# Patient Record
Sex: MALE | Race: WHITE | Employment: OTHER | URBAN - METROPOLITAN AREA
[De-identification: names, ages, dates, MRNs, and addresses within clinical notes are randomized per-mention and may not be internally consistent; named-entity substitution may affect disease eponyms.]

---

## 2022-11-22 ENCOUNTER — APPOINTMENT (OUTPATIENT)
Dept: GENERAL RADIOLOGY | Age: 77
DRG: 871 | End: 2022-11-22
Payer: OTHER GOVERNMENT

## 2022-11-22 ENCOUNTER — HOSPITAL ENCOUNTER (INPATIENT)
Age: 77
LOS: 7 days | Discharge: HOME | DRG: 871 | End: 2022-11-29
Attending: EMERGENCY MEDICINE | Admitting: INTERNAL MEDICINE
Payer: OTHER GOVERNMENT

## 2022-11-22 DIAGNOSIS — D72.829 LEUKOCYTOSIS, UNSPECIFIED TYPE: ICD-10-CM

## 2022-11-22 DIAGNOSIS — R74.01 TRANSAMINITIS: ICD-10-CM

## 2022-11-22 DIAGNOSIS — E87.1 HYPONATREMIA: ICD-10-CM

## 2022-11-22 DIAGNOSIS — J18.9 COMMUNITY ACQUIRED PNEUMONIA, UNSPECIFIED LATERALITY: Primary | ICD-10-CM

## 2022-11-22 PROBLEM — J96.01 ACUTE RESPIRATORY FAILURE WITH HYPOXIA (HCC): Status: ACTIVE | Noted: 2022-11-22

## 2022-11-22 LAB
ALBUMIN SERPL-MCNC: 2.5 GM/DL (ref 3.4–5)
ALP BLD-CCNC: 203 IU/L (ref 40–129)
ALT SERPL-CCNC: 32 U/L (ref 10–40)
ANION GAP SERPL CALCULATED.3IONS-SCNC: 13 MMOL/L (ref 4–16)
AST SERPL-CCNC: 49 IU/L (ref 15–37)
BANDED NEUTROPHILS ABSOLUTE COUNT: 2.16 K/CU MM
BANDED NEUTROPHILS RELATIVE PERCENT: 9 % (ref 5–11)
BILIRUB SERPL-MCNC: 1.2 MG/DL (ref 0–1)
BUN BLDV-MCNC: 33 MG/DL (ref 6–23)
CALCIUM SERPL-MCNC: 8.3 MG/DL (ref 8.3–10.6)
CHLORIDE BLD-SCNC: 93 MMOL/L (ref 99–110)
CO2: 20 MMOL/L (ref 21–32)
CREAT SERPL-MCNC: 1.3 MG/DL (ref 0.9–1.3)
DIFFERENTIAL TYPE: ABNORMAL
GFR SERPL CREATININE-BSD FRML MDRD: 56 ML/MIN/1.73M2
GLUCOSE BLD-MCNC: 113 MG/DL (ref 70–99)
HCT VFR BLD CALC: 47.7 % (ref 42–52)
HEMOGLOBIN: 16.3 GM/DL (ref 13.5–18)
LACTATE: 2.4 MMOL/L (ref 0.4–2)
LYMPHOCYTES ABSOLUTE: 1.4 K/CU MM
LYMPHOCYTES RELATIVE PERCENT: 6 % (ref 24–44)
MCH RBC QN AUTO: 32.9 PG (ref 27–31)
MCHC RBC AUTO-ENTMCNC: 34.2 % (ref 32–36)
MCV RBC AUTO: 96.4 FL (ref 78–100)
MONOCYTES ABSOLUTE: 1 K/CU MM
MONOCYTES RELATIVE PERCENT: 4 % (ref 0–4)
MYELOCYTE PERCENT: 1 %
MYELOCYTES ABSOLUTE COUNT: 0.24 K/CU MM
PDW BLD-RTO: 16.1 % (ref 11.7–14.9)
PLATELET # BLD: 258 K/CU MM (ref 140–440)
PMV BLD AUTO: 10.7 FL (ref 7.5–11.1)
POTASSIUM SERPL-SCNC: 4.7 MMOL/L (ref 3.5–5.1)
PRO-BNP: 988.6 PG/ML
RAPID INFLUENZA  B AGN: NEGATIVE
RAPID INFLUENZA A AGN: NEGATIVE
RBC # BLD: 4.95 M/CU MM (ref 4.6–6.2)
RBC # BLD: ABNORMAL 10*6/UL
REASON FOR REJECTION: NORMAL
REJECTED TEST: NORMAL
SARS-COV-2, NAAT: NOT DETECTED
SEGMENTED NEUTROPHILS ABSOLUTE COUNT: 19.2 K/CU MM
SEGMENTED NEUTROPHILS RELATIVE PERCENT: 80 % (ref 36–66)
SODIUM BLD-SCNC: 126 MMOL/L (ref 135–145)
SOURCE: NORMAL
TOTAL PROTEIN: 6.7 GM/DL (ref 6.4–8.2)
TOXIC GRANULATION: PRESENT
TROPONIN T: <0.01 NG/ML
WBC # BLD: 24 K/CU MM (ref 4–10.5)

## 2022-11-22 PROCEDURE — 85027 COMPLETE CBC AUTOMATED: CPT

## 2022-11-22 PROCEDURE — 71045 X-RAY EXAM CHEST 1 VIEW: CPT

## 2022-11-22 PROCEDURE — 1200000000 HC SEMI PRIVATE

## 2022-11-22 PROCEDURE — 6370000000 HC RX 637 (ALT 250 FOR IP): Performed by: INTERNAL MEDICINE

## 2022-11-22 PROCEDURE — 84145 PROCALCITONIN (PCT): CPT

## 2022-11-22 PROCEDURE — 84484 ASSAY OF TROPONIN QUANT: CPT

## 2022-11-22 PROCEDURE — 2580000003 HC RX 258: Performed by: EMERGENCY MEDICINE

## 2022-11-22 PROCEDURE — 83605 ASSAY OF LACTIC ACID: CPT

## 2022-11-22 PROCEDURE — 83880 ASSAY OF NATRIURETIC PEPTIDE: CPT

## 2022-11-22 PROCEDURE — 87635 SARS-COV-2 COVID-19 AMP PRB: CPT

## 2022-11-22 PROCEDURE — 94640 AIRWAY INHALATION TREATMENT: CPT

## 2022-11-22 PROCEDURE — 93005 ELECTROCARDIOGRAM TRACING: CPT | Performed by: EMERGENCY MEDICINE

## 2022-11-22 PROCEDURE — 85007 BL SMEAR W/DIFF WBC COUNT: CPT

## 2022-11-22 PROCEDURE — 6370000000 HC RX 637 (ALT 250 FOR IP): Performed by: EMERGENCY MEDICINE

## 2022-11-22 PROCEDURE — 87804 INFLUENZA ASSAY W/OPTIC: CPT

## 2022-11-22 PROCEDURE — 99285 EMERGENCY DEPT VISIT HI MDM: CPT

## 2022-11-22 PROCEDURE — 80053 COMPREHEN METABOLIC PANEL: CPT

## 2022-11-22 PROCEDURE — 87040 BLOOD CULTURE FOR BACTERIA: CPT

## 2022-11-22 PROCEDURE — 96365 THER/PROPH/DIAG IV INF INIT: CPT

## 2022-11-22 PROCEDURE — 2060000000 HC ICU INTERMEDIATE R&B

## 2022-11-22 PROCEDURE — 6360000002 HC RX W HCPCS: Performed by: EMERGENCY MEDICINE

## 2022-11-22 RX ORDER — 0.9 % SODIUM CHLORIDE 0.9 %
1000 INTRAVENOUS SOLUTION INTRAVENOUS ONCE
Status: COMPLETED | OUTPATIENT
Start: 2022-11-23 | End: 2022-11-23

## 2022-11-22 RX ORDER — IPRATROPIUM BROMIDE AND ALBUTEROL SULFATE 2.5; .5 MG/3ML; MG/3ML
1 SOLUTION RESPIRATORY (INHALATION) ONCE
Status: COMPLETED | OUTPATIENT
Start: 2022-11-23 | End: 2022-11-22

## 2022-11-22 RX ORDER — AZITHROMYCIN 250 MG/1
250 TABLET, FILM COATED ORAL ONCE
Status: COMPLETED | OUTPATIENT
Start: 2022-11-22 | End: 2022-11-22

## 2022-11-22 RX ORDER — M-VIT,TX,IRON,MINS/CALC/FOLIC 27MG-0.4MG
1 TABLET ORAL DAILY
COMMUNITY
End: 2022-11-23

## 2022-11-22 RX ORDER — PANTOPRAZOLE SODIUM 20 MG/1
40 TABLET, DELAYED RELEASE ORAL DAILY
COMMUNITY

## 2022-11-22 RX ADMIN — CEFTRIAXONE SODIUM 1000 MG: 1 INJECTION, POWDER, FOR SOLUTION INTRAMUSCULAR; INTRAVENOUS at 22:24

## 2022-11-22 RX ADMIN — IPRATROPIUM BROMIDE AND ALBUTEROL SULFATE 1 AMPULE: .5; 2.5 SOLUTION RESPIRATORY (INHALATION) at 23:50

## 2022-11-22 RX ADMIN — AZITHROMYCIN MONOHYDRATE 250 MG: 250 TABLET ORAL at 22:44

## 2022-11-23 ENCOUNTER — APPOINTMENT (OUTPATIENT)
Dept: CT IMAGING | Age: 77
DRG: 871 | End: 2022-11-23
Payer: OTHER GOVERNMENT

## 2022-11-23 LAB
ADENOVIRUS DETECTION BY PCR: NOT DETECTED
ANION GAP SERPL CALCULATED.3IONS-SCNC: 10 MMOL/L (ref 4–16)
ANISOCYTOSIS: ABNORMAL
BACTERIA: NEGATIVE /HPF
BANDED NEUTROPHILS ABSOLUTE COUNT: 5.68 K/CU MM
BANDED NEUTROPHILS RELATIVE PERCENT: 20 % (ref 5–11)
BASE EXCESS: 7 (ref 0–3.3)
BASOPHILS ABSOLUTE: 0.3 K/CU MM
BASOPHILS RELATIVE PERCENT: 1 % (ref 0–1)
BILIRUBIN URINE: NEGATIVE MG/DL
BLOOD, URINE: NEGATIVE
BORDETELLA PARAPERTUSSIS BY PCR: NOT DETECTED
BORDETELLA PERTUSSIS PCR: NOT DETECTED
BUN BLDV-MCNC: 32 MG/DL (ref 6–23)
CALCIUM SERPL-MCNC: 7.5 MG/DL (ref 8.3–10.6)
CARBON MONOXIDE, BLOOD: 2.1 % (ref 0–5)
CHLAMYDOPHILA PNEUMONIA PCR: NOT DETECTED
CHLORIDE BLD-SCNC: 98 MMOL/L (ref 99–110)
CLARITY: CLEAR
CO2 CONTENT: 16 MMOL/L (ref 19–24)
CO2: 20 MMOL/L (ref 21–32)
COLOR: YELLOW
COMMENT: ABNORMAL
CORONAVIRUS 229E PCR: NOT DETECTED
CORONAVIRUS HKU1 PCR: NOT DETECTED
CORONAVIRUS NL63 PCR: NOT DETECTED
CORONAVIRUS OC43 PCR: NOT DETECTED
CREAT SERPL-MCNC: 1.2 MG/DL (ref 0.9–1.3)
DIFFERENTIAL TYPE: ABNORMAL
DOHLE BODIES: PRESENT
GFR SERPL CREATININE-BSD FRML MDRD: >60 ML/MIN/1.73M2
GLUCOSE BLD-MCNC: 118 MG/DL (ref 70–99)
GLUCOSE, URINE: NEGATIVE MG/DL
HCO3 ARTERIAL: 15.3 MMOL/L (ref 18–23)
HCT VFR BLD CALC: 41.7 % (ref 42–52)
HEMOGLOBIN: 14.3 GM/DL (ref 13.5–18)
HUMAN METAPNEUMOVIRUS PCR: NOT DETECTED
INFLUENZA A BY PCR: NOT DETECTED
INFLUENZA A H1 (2009) PCR: NOT DETECTED
INFLUENZA A H1 PANDEMIC PCR: NOT DETECTED
INFLUENZA A H3 PCR: NOT DETECTED
INFLUENZA B BY PCR: NOT DETECTED
KETONES, URINE: NEGATIVE MG/DL
LACTATE: 1.8 MMOL/L (ref 0.4–2)
LACTATE: 2.2 MMOL/L (ref 0.4–2)
LEGIONELLA URINARY AG: NEGATIVE
LEUKOCYTE ESTERASE, URINE: NEGATIVE
LYMPHOCYTES ABSOLUTE: 1.4 K/CU MM
LYMPHOCYTES RELATIVE PERCENT: 5 % (ref 24–44)
MCH RBC QN AUTO: 33.2 PG (ref 27–31)
MCHC RBC AUTO-ENTMCNC: 34.3 % (ref 32–36)
MCV RBC AUTO: 96.8 FL (ref 78–100)
METHEMOGLOBIN ARTERIAL: 1.4 %
MONOCYTES ABSOLUTE: 1.4 K/CU MM
MONOCYTES RELATIVE PERCENT: 5 % (ref 0–4)
MYCOPLASMA PNEUMONIAE PCR: NOT DETECTED
MYELOCYTE PERCENT: 1 %
MYELOCYTES ABSOLUTE COUNT: 0.28 K/CU MM
NITRITE URINE, QUANTITATIVE: NEGATIVE
O2 SATURATION: 88 % (ref 96–97)
OSMOLALITY URINE: 482 MOS/L (ref 292–1090)
OSMOLALITY: 291 MOS/L (ref 280–300)
PARAINFLUENZA 1 PCR: NOT DETECTED
PARAINFLUENZA 2 PCR: NOT DETECTED
PARAINFLUENZA 3 PCR: NOT DETECTED
PARAINFLUENZA 4 PCR: NOT DETECTED
PCO2 ARTERIAL: 22 MMHG (ref 32–45)
PDW BLD-RTO: 16.3 % (ref 11.7–14.9)
PH BLOOD: 7.45 (ref 7.34–7.45)
PH, URINE: 5.5 (ref 5–8)
PLATELET # BLD: 271 K/CU MM (ref 140–440)
PMV BLD AUTO: 10.4 FL (ref 7.5–11.1)
PO2 ARTERIAL: 53 MMHG (ref 75–100)
POTASSIUM SERPL-SCNC: 4.3 MMOL/L (ref 3.5–5.1)
PROCALCITONIN: 73.17
PROTEIN UA: ABNORMAL MG/DL
RBC # BLD: 4.31 M/CU MM (ref 4.6–6.2)
RBC URINE: <1 /HPF (ref 0–3)
RHINOVIRUS ENTEROVIRUS PCR: NOT DETECTED
RSV PCR: ABNORMAL
SARS-COV-2: NOT DETECTED
SEGMENTED NEUTROPHILS ABSOLUTE COUNT: 19.3 K/CU MM
SEGMENTED NEUTROPHILS RELATIVE PERCENT: 68 % (ref 36–66)
SODIUM BLD-SCNC: 128 MMOL/L (ref 135–145)
SODIUM URINE: 10 MMOL/L (ref 35–167)
SPECIFIC GRAVITY UA: 1.01 (ref 1–1.03)
SQUAMOUS EPITHELIAL: <1 /HPF
STREP PNEUMONIAE ANTIGEN: NORMAL
TOXIC GRANULATION: PRESENT
TRICHOMONAS: NORMAL /HPF
UROBILINOGEN, URINE: 2 MG/DL (ref 0.2–1)
WBC # BLD: 28.4 K/CU MM (ref 4–10.5)
WBC UA: <1 /HPF (ref 0–2)

## 2022-11-23 PROCEDURE — 6370000000 HC RX 637 (ALT 250 FOR IP): Performed by: STUDENT IN AN ORGANIZED HEALTH CARE EDUCATION/TRAINING PROGRAM

## 2022-11-23 PROCEDURE — 2580000003 HC RX 258: Performed by: STUDENT IN AN ORGANIZED HEALTH CARE EDUCATION/TRAINING PROGRAM

## 2022-11-23 PROCEDURE — 6370000000 HC RX 637 (ALT 250 FOR IP): Performed by: INTERNAL MEDICINE

## 2022-11-23 PROCEDURE — 2580000003 HC RX 258: Performed by: INTERNAL MEDICINE

## 2022-11-23 PROCEDURE — 87899 AGENT NOS ASSAY W/OPTIC: CPT

## 2022-11-23 PROCEDURE — 0202U NFCT DS 22 TRGT SARS-COV-2: CPT

## 2022-11-23 PROCEDURE — 2700000000 HC OXYGEN THERAPY PER DAY

## 2022-11-23 PROCEDURE — 85027 COMPLETE CBC AUTOMATED: CPT

## 2022-11-23 PROCEDURE — 6360000004 HC RX CONTRAST MEDICATION: Performed by: STUDENT IN AN ORGANIZED HEALTH CARE EDUCATION/TRAINING PROGRAM

## 2022-11-23 PROCEDURE — 84300 ASSAY OF URINE SODIUM: CPT

## 2022-11-23 PROCEDURE — 6360000002 HC RX W HCPCS: Performed by: STUDENT IN AN ORGANIZED HEALTH CARE EDUCATION/TRAINING PROGRAM

## 2022-11-23 PROCEDURE — 71275 CT ANGIOGRAPHY CHEST: CPT

## 2022-11-23 PROCEDURE — 81001 URINALYSIS AUTO W/SCOPE: CPT

## 2022-11-23 PROCEDURE — 80048 BASIC METABOLIC PNL TOTAL CA: CPT

## 2022-11-23 PROCEDURE — 83930 ASSAY OF BLOOD OSMOLALITY: CPT

## 2022-11-23 PROCEDURE — 6370000000 HC RX 637 (ALT 250 FOR IP): Performed by: NURSE PRACTITIONER

## 2022-11-23 PROCEDURE — 83935 ASSAY OF URINE OSMOLALITY: CPT

## 2022-11-23 PROCEDURE — 87449 NOS EACH ORGANISM AG IA: CPT

## 2022-11-23 PROCEDURE — 83605 ASSAY OF LACTIC ACID: CPT

## 2022-11-23 PROCEDURE — 6360000002 HC RX W HCPCS: Performed by: INTERNAL MEDICINE

## 2022-11-23 PROCEDURE — 2060000000 HC ICU INTERMEDIATE R&B

## 2022-11-23 PROCEDURE — 85007 BL SMEAR W/DIFF WBC COUNT: CPT

## 2022-11-23 PROCEDURE — 36415 COLL VENOUS BLD VENIPUNCTURE: CPT

## 2022-11-23 PROCEDURE — 82803 BLOOD GASES ANY COMBINATION: CPT

## 2022-11-23 RX ORDER — SODIUM CHLORIDE 0.9 % (FLUSH) 0.9 %
5-40 SYRINGE (ML) INJECTION EVERY 12 HOURS SCHEDULED
Status: DISCONTINUED | OUTPATIENT
Start: 2022-11-23 | End: 2022-11-29 | Stop reason: HOSPADM

## 2022-11-23 RX ORDER — ENOXAPARIN SODIUM 100 MG/ML
40 INJECTION SUBCUTANEOUS DAILY
Status: DISCONTINUED | OUTPATIENT
Start: 2022-11-23 | End: 2022-11-29 | Stop reason: HOSPADM

## 2022-11-23 RX ORDER — PANTOPRAZOLE SODIUM 40 MG/1
40 TABLET, DELAYED RELEASE ORAL DAILY
Status: DISCONTINUED | OUTPATIENT
Start: 2022-11-23 | End: 2022-11-29 | Stop reason: HOSPADM

## 2022-11-23 RX ORDER — ALBUTEROL SULFATE 90 UG/1
2 AEROSOL, METERED RESPIRATORY (INHALATION)
Status: DISCONTINUED | OUTPATIENT
Start: 2022-11-23 | End: 2022-11-23

## 2022-11-23 RX ORDER — POLYETHYLENE GLYCOL 3350 17 G/17G
17 POWDER, FOR SOLUTION ORAL DAILY PRN
Status: DISCONTINUED | OUTPATIENT
Start: 2022-11-23 | End: 2022-11-29 | Stop reason: HOSPADM

## 2022-11-23 RX ORDER — IPRATROPIUM BROMIDE AND ALBUTEROL SULFATE 2.5; .5 MG/3ML; MG/3ML
1 SOLUTION RESPIRATORY (INHALATION) EVERY 4 HOURS PRN
Status: DISCONTINUED | OUTPATIENT
Start: 2022-11-23 | End: 2022-11-29 | Stop reason: HOSPADM

## 2022-11-23 RX ORDER — SODIUM CHLORIDE 9 MG/ML
INJECTION, SOLUTION INTRAVENOUS CONTINUOUS
Status: DISCONTINUED | OUTPATIENT
Start: 2022-11-23 | End: 2022-11-24

## 2022-11-23 RX ORDER — ACETAMINOPHEN 650 MG/1
650 SUPPOSITORY RECTAL EVERY 6 HOURS PRN
Status: DISCONTINUED | OUTPATIENT
Start: 2022-11-23 | End: 2022-11-29 | Stop reason: HOSPADM

## 2022-11-23 RX ORDER — PREDNISONE 20 MG/1
40 TABLET ORAL DAILY
Status: DISCONTINUED | OUTPATIENT
Start: 2022-11-25 | End: 2022-11-26

## 2022-11-23 RX ORDER — ASPIRIN 81 MG/1
81 TABLET ORAL DAILY
Status: DISCONTINUED | OUTPATIENT
Start: 2022-11-23 | End: 2022-11-29 | Stop reason: HOSPADM

## 2022-11-23 RX ORDER — SODIUM CHLORIDE 0.9 % (FLUSH) 0.9 %
5-40 SYRINGE (ML) INJECTION PRN
Status: DISCONTINUED | OUTPATIENT
Start: 2022-11-23 | End: 2022-11-29 | Stop reason: HOSPADM

## 2022-11-23 RX ORDER — METHYLPREDNISOLONE SODIUM SUCCINATE 40 MG/ML
40 INJECTION, POWDER, LYOPHILIZED, FOR SOLUTION INTRAMUSCULAR; INTRAVENOUS EVERY 8 HOURS
Status: DISPENSED | OUTPATIENT
Start: 2022-11-23 | End: 2022-11-25

## 2022-11-23 RX ORDER — ATORVASTATIN CALCIUM 10 MG/1
10 TABLET, FILM COATED ORAL DAILY
Status: DISCONTINUED | OUTPATIENT
Start: 2022-11-23 | End: 2022-11-29 | Stop reason: HOSPADM

## 2022-11-23 RX ORDER — SIMVASTATIN 20 MG
20 TABLET ORAL NIGHTLY
Status: ON HOLD | COMMUNITY
End: 2022-11-29 | Stop reason: HOSPADM

## 2022-11-23 RX ORDER — IPRATROPIUM BROMIDE AND ALBUTEROL SULFATE 2.5; .5 MG/3ML; MG/3ML
1 SOLUTION RESPIRATORY (INHALATION)
Status: DISCONTINUED | OUTPATIENT
Start: 2022-11-23 | End: 2022-11-23

## 2022-11-23 RX ORDER — ONDANSETRON 4 MG/1
4 TABLET, ORALLY DISINTEGRATING ORAL EVERY 8 HOURS PRN
Status: DISCONTINUED | OUTPATIENT
Start: 2022-11-23 | End: 2022-11-29 | Stop reason: HOSPADM

## 2022-11-23 RX ORDER — ALBUTEROL SULFATE 90 UG/1
2 AEROSOL, METERED RESPIRATORY (INHALATION)
Status: DISCONTINUED | OUTPATIENT
Start: 2022-11-23 | End: 2022-11-29 | Stop reason: HOSPADM

## 2022-11-23 RX ORDER — OMEGA-3-ACID ETHYL ESTERS 1 G/1
2000 CAPSULE, LIQUID FILLED ORAL DAILY
Status: DISCONTINUED | OUTPATIENT
Start: 2022-11-23 | End: 2022-11-29 | Stop reason: HOSPADM

## 2022-11-23 RX ORDER — NICOTINE POLACRILEX MINI 2 MG/1
LOZENGE ORAL
COMMUNITY

## 2022-11-23 RX ORDER — CHLORAL HYDRATE 500 MG
2000 CAPSULE ORAL DAILY
COMMUNITY

## 2022-11-23 RX ORDER — ASPIRIN 81 MG/1
81 TABLET ORAL DAILY
COMMUNITY

## 2022-11-23 RX ORDER — ACETAMINOPHEN 325 MG/1
650 TABLET ORAL EVERY 6 HOURS PRN
Status: DISCONTINUED | OUTPATIENT
Start: 2022-11-23 | End: 2022-11-29 | Stop reason: HOSPADM

## 2022-11-23 RX ORDER — SODIUM CHLORIDE 9 MG/ML
INJECTION, SOLUTION INTRAVENOUS PRN
Status: DISCONTINUED | OUTPATIENT
Start: 2022-11-23 | End: 2022-11-29 | Stop reason: HOSPADM

## 2022-11-23 RX ORDER — 0.9 % SODIUM CHLORIDE 0.9 %
10 VIAL (ML) INJECTION
Status: COMPLETED | OUTPATIENT
Start: 2022-11-23 | End: 2022-11-23

## 2022-11-23 RX ORDER — ONDANSETRON 2 MG/ML
4 INJECTION INTRAMUSCULAR; INTRAVENOUS EVERY 6 HOURS PRN
Status: DISCONTINUED | OUTPATIENT
Start: 2022-11-23 | End: 2022-11-29 | Stop reason: HOSPADM

## 2022-11-23 RX ORDER — M-VIT,TX,IRON,MINS/CALC/FOLIC 27MG-0.4MG
1 TABLET ORAL DAILY
Status: DISCONTINUED | OUTPATIENT
Start: 2022-11-23 | End: 2022-11-29 | Stop reason: HOSPADM

## 2022-11-23 RX ORDER — ALBUTEROL SULFATE 90 UG/1
2 AEROSOL, METERED RESPIRATORY (INHALATION) EVERY 6 HOURS PRN
Status: DISCONTINUED | OUTPATIENT
Start: 2022-11-23 | End: 2022-11-23

## 2022-11-23 RX ORDER — VITAMIN B COMPLEX
2000 TABLET ORAL DAILY
Status: DISCONTINUED | OUTPATIENT
Start: 2022-11-23 | End: 2022-11-29 | Stop reason: HOSPADM

## 2022-11-23 RX ORDER — MELOXICAM 15 MG/1
15 TABLET ORAL DAILY PRN
Status: ON HOLD | COMMUNITY
End: 2022-11-29 | Stop reason: HOSPADM

## 2022-11-23 RX ADMIN — Medication 10 ML: at 09:27

## 2022-11-23 RX ADMIN — METHYLPREDNISOLONE SODIUM SUCCINATE 40 MG: 40 INJECTION, POWDER, FOR SOLUTION INTRAMUSCULAR; INTRAVENOUS at 16:49

## 2022-11-23 RX ADMIN — ACETAMINOPHEN 650 MG: 325 TABLET ORAL at 01:16

## 2022-11-23 RX ADMIN — PANTOPRAZOLE SODIUM 40 MG: 40 TABLET, DELAYED RELEASE ORAL at 10:32

## 2022-11-23 RX ADMIN — SODIUM CHLORIDE 1000 ML: 9 INJECTION, SOLUTION INTRAVENOUS at 00:58

## 2022-11-23 RX ADMIN — IOPAMIDOL 75 ML: 755 INJECTION, SOLUTION INTRAVENOUS at 09:27

## 2022-11-23 RX ADMIN — ATORVASTATIN CALCIUM 10 MG: 10 TABLET, FILM COATED ORAL at 10:32

## 2022-11-23 RX ADMIN — Medication 1 TABLET: at 10:32

## 2022-11-23 RX ADMIN — ENOXAPARIN SODIUM 40 MG: 100 INJECTION SUBCUTANEOUS at 10:33

## 2022-11-23 RX ADMIN — CEFTRIAXONE SODIUM 2000 MG: 2 INJECTION, POWDER, FOR SOLUTION INTRAMUSCULAR; INTRAVENOUS at 21:49

## 2022-11-23 RX ADMIN — ASPIRIN 81 MG: 81 TABLET, COATED ORAL at 10:32

## 2022-11-23 RX ADMIN — METHYLPREDNISOLONE SODIUM SUCCINATE 40 MG: 40 INJECTION, POWDER, FOR SOLUTION INTRAMUSCULAR; INTRAVENOUS at 01:16

## 2022-11-23 RX ADMIN — SODIUM CHLORIDE 1000 ML: 9 INJECTION, SOLUTION INTRAVENOUS at 15:06

## 2022-11-23 ASSESSMENT — PAIN SCALES - GENERAL
PAINLEVEL_OUTOF10: 7
PAINLEVEL_OUTOF10: 0

## 2022-11-23 ASSESSMENT — PAIN DESCRIPTION - LOCATION: LOCATION: GENERALIZED

## 2022-11-23 NOTE — PROGRESS NOTES
V2.0  [facility] Hospitalist Progress Note      Name:  Kosta Thakkar /Age/Sex:   (73 y.o. male)   MRN & CSN:  [id number] & [id number] Encounter Date/Time: 2022 1:23 PM EST    Location:  -A PCP: No primary care provider on file. Hospital Day: 2    Assessment and Plan:   Kosta Thakkar is a 65 y.o. male with pmh of hyperlipidemia, CKD stage II, chronic alcohol use, macular degeneration, history of splenectomy who presents with Acute respiratory failure with hypoxia ([facility] [address]    #. Acute respiratory failure with hypoxia probably secondary to pneumonia, COPD exacerbation  - Patient not on home oxygen, on presentation patient was requiring 2 L of oxygen and increased to 6 L, -VBG-pH 7.44, PCO2 22, PO2 53, HCO3 15.3.  - Chest x-ray-multifocal bilateral patchy opacities-most pronounced in the right upper lobe-differential considerations include multifocal pneumonia and asymmetric pulmonary edema. Patient still requiring 6 L of oxygen this morning and strep pneumonia antigen positive, Legionella negative obtained CT no PE, multifocal groundglass and dense airspace consolidations concerning for infection, follow-up on blood cultures, will obtain respiratory PCR     # Severe sepsis secondary to above  - Tachycardia, tachypnea, lactic acidosis, leukocytosis. - Got only a liter of fluid bolus, due to concern for fluid overload. #  Pneumonia  -Patient has leukocytosis, lactic acid 2.4, procalcitonin 73.17  -Rapid influenza, COVID-19 Negative  -Urine Streptococcus pneumonia antigen positive, Legionella antigen negative  -Sputum culture ordered  Pending  -Blood cultures done in ED pending  -Continue ceftriaxone, azithromycin. # COPD exacerbation  -Patient has wheezing, congested on examination  -Continue IV steroids, bronchodilator therapy.  -Patient reports he quit smoking 7 years ago.        # Hyponatremia -improving     # There is a concern that patient could be having pulmonary edema and overall picture consistent with CHF. -proBNP 988, Na 126 patient does not appear to be fluid overloaded  -2D echo ordered to evaluate left ventricular function  -If patient symptoms does not improve,     #.  Mild renal insufficiency versus CKD  - Patient has creatinine 1.28- 3/2022 as per Care everywhere  - Creatinine 1.3 - 1.2  - Monitor with repeat lab work. # Hyperlipidemia-on fish oil, simvastatin     # Macular degeneration - AREDS     # History of splenectomy     # GERD-on Protonix     # Alcohol use  - Patient admits to drinking 3-4 beers/2-3 times per week. - Monitor for withdrawal.      Diet ADULT DIET; Regular   DVT Prophylaxis [x] Lovenox, []  Heparin, [] SCDs, [] Ambulation,  [] Eliquis, [] Xarelto  [] Coumadin   Code Status Full Code   Disposition From: Home  Expected Disposition: Home  Estimated Date of Discharge: TBD  Patient requires continued admission due to sepsis   Surrogate Decision Maker/ POA Self     Subjective:     Chief Complaint: Shortness of Breath (X4 days) and Cough       Patient seen and examined at bedside. Still have cough, sputum and shortness of breath         Review of Systems:    Review of Systems    Constitutional: No fever no chills  HEENT: No headache no dizziness  Cardiovascular: No chest pain no palpitations  Respiratory: cough shortness of breath  Abdomen: No diarrhea, no nausea, no vomiting, no abdominal pain  Musculoskeletal: no Swelling  Neuro: No numbness or tingling  Skin: No rash      Objective: Intake/Output Summary (Last 24 hours) at 11/23/2022 1323  Last data filed at 11/23/2022 0217  Gross per 24 hour   Intake 1050 ml   Output --   Net 1050 ml        Vitals:   Vitals:    11/23/22 1259   BP: 124/61   Pulse: 87   Resp: 27   Temp: 96.8 °F (36 °C)   SpO2:        Physical Exam:     General: Afebrile, no distress but requiring 6 L  Eyes: EOMI  ENT: neck supple  Cardiovascular: Regular rate. Respiratory:  Air entry good bilaterally intermittent wheezing  Gastrointestinal: Soft, non tender  Genitourinary: no suprapubic tenderness  Musculoskeletal: No edema  Skin: warm, dry  Neuro: Alert. Psych: Mood appropriate.      Medications:   Medications:    sodium chloride flush  5-40 mL IntraVENous 2 times per day    enoxaparin  40 mg SubCUTAneous Daily    ipratropium-albuterol  1 ampule Inhalation Q4H WA    methylPREDNISolone  40 mg IntraVENous Q8H    Followed by    Jory Howard ON 11/25/2022] predniSONE  40 mg Oral Daily    cefTRIAXone (ROCEPHIN) IV  1,000 mg IntraVENous Q24H    azithromycin  500 mg IntraVENous Q24H    aspirin  81 mg Oral Daily    therapeutic multivitamin-minerals  1 tablet Oral Daily    omega-3 acid ethyl esters  2,000 mg Oral Daily    pantoprazole  40 mg Oral Daily    atorvastatin  10 mg Oral Daily    Vitamin D  2,000 Units Oral Daily      Infusions:    sodium chloride       PRN Meds: sodium chloride flush, 5-40 mL, PRN  sodium chloride, , PRN  ondansetron, 4 mg, Q8H PRN   Or  ondansetron, 4 mg, Q6H PRN  polyethylene glycol, 17 g, Daily PRN  acetaminophen, 650 mg, Q6H PRN   Or  acetaminophen, 650 mg, Q6H PRN  albuterol sulfate HFA, 2 puff, Q6H PRN        Labs      Recent Results (from the past 24 hour(s))   EKG 12 Lead    Collection Time: 11/22/22  7:25 PM   Result Value Ref Range    Ventricular Rate 116 BPM    Atrial Rate 116 BPM    P-R Interval 128 ms    QRS Duration 86 ms    Q-T Interval 338 ms    QTc Calculation (Bazett) 469 ms    P Axis 51 degrees    R Axis 17 degrees    T Axis 63 degrees    Diagnosis       Sinus tachycardia  Septal infarct , age undetermined  Abnormal ECG  No previous ECGs available     CBC with Auto Differential    Collection Time: 11/22/22  9:20 PM   Result Value Ref Range    WBC 24.0 (H) 4.0 - 10.5 K/CU MM    RBC 4.95 4.6 - 6.2 M/CU MM    Hemoglobin 16.3 13.5 - 18.0 GM/DL    Hematocrit 47.7 42 - 52 %    MCV 96.4 78 - 100 FL    MCH 32.9 (H) 27 - 31 PG    MCHC 34.2 32.0 - 36.0 %    RDW 16.1 (H) 11.7 - 14.9 %    Platelets 459 140 - 440 K/CU MM    MPV 10.7 7.5 - 11.1 FL    Myelocyte Percent 1 (H) 0.0 %    Bands Relative 9 5 - 11 %    Segs Relative 80.0 (H) 36 - 66 %    Lymphocytes % 6.0 (L) 24 - 44 %    Monocytes % 4.0 0 - 4 %    Myelocytes Absolute 0.24 K/CU MM    Bands Absolute 2.16 K/CU MM    Segs Absolute 19.2 K/CU MM    Lymphocytes Absolute 1.4 K/CU MM    Monocytes Absolute 1.0 K/CU MM    Differential Type MANUAL DIFFERENTIAL     RBC Morphology FEW     Toxic Granulation PRESENT    COVID-19, Rapid    Collection Time: 11/22/22  9:20 PM    Specimen: Nasopharyngeal   Result Value Ref Range    Source UNKNOWN     SARS-CoV-2, NAAT NOT DETECTED NOT DETECTED   Rapid Flu Swab    Collection Time: 11/22/22  9:20 PM    Specimen: Nasopharyngeal   Result Value Ref Range    Rapid Influenza A Ag NEGATIVE NEGATIVE    Rapid Influenza B Ag NEGATIVE NEGATIVE   SPECIMEN REJECTION    Collection Time: 11/22/22  9:20 PM   Result Value Ref Range    Rejected Test CHEM SPECIMEN HEMOLYZED     Reason for Rejection UNABLE TO PERFORM TESTING:    Comprehensive Metabolic Panel w/ Reflex to MG    Collection Time: 11/22/22  9:45 PM   Result Value Ref Range    Sodium 126 (L) 135 - 145 MMOL/L    Potassium 4.7 3.5 - 5.1 MMOL/L    Chloride 93 (L) 99 - 110 mMol/L    CO2 20 (L) 21 - 32 MMOL/L    BUN 33 (H) 6 - 23 MG/DL    Creatinine 1.3 0.9 - 1.3 MG/DL    Est, Glom Filt Rate 56 (L) >60 mL/min/1.73m2    Glucose 113 (H) 70 - 99 MG/DL    Calcium 8.3 8.3 - 10.6 MG/DL    Albumin 2.5 (L) 3.4 - 5.0 GM/DL    Total Protein 6.7 6.4 - 8.2 GM/DL    Total Bilirubin 1.2 (H) 0.0 - 1.0 MG/DL    ALT 32 10 - 40 U/L    AST 49 (H) 15 - 37 IU/L    Alkaline Phosphatase 203 (H) 40 - 129 IU/L    Anion Gap 13 4 - 16   Brain Natriuretic Peptide    Collection Time: 11/22/22  9:45 PM   Result Value Ref Range    Pro-.6 (H) <300 PG/ML   Troponin    Collection Time: 11/22/22  9:45 PM   Result Value Ref Range    Troponin T <0.010 <0.01 NG/ML   Procalcitonin    Collection Time: 11/22/22  9:45 PM Result Value Ref Range    Procalcitonin 73.17    Lactic Acid    Collection Time: 11/22/22 10:15 PM   Result Value Ref Range    Lactate 2.4 (HH) 0.4 - 2.0 mMOL/L   Strep Pneumoniae Antigen    Collection Time: 11/23/22  1:00 AM    Specimen: CSF   Result Value Ref Range    Strep pneumo Ag URINE POSITIVE    Blood gas, arterial    Collection Time: 11/23/22  1:45 AM   Result Value Ref Range    pH, Bld 7.45 7.34 - 7.45    pCO2, Arterial 22.0 (L) 32 - 45 MMHG    pO2, Arterial 53 (L) 75 - 100 MMHG    Base Excess 7 (H) 0 - 3.3    HCO3, Arterial 15.3 (L) 18 - 23 MMOL/L    CO2 Content 16.0 (L) 19 - 24 MMOL/L    O2 Sat 88.0 (L) 96 - 97 %    Carbon Monoxide, Blood 2.1 0 - 5 %    Methemoglobin, Arterial 1.4 <1.5 %    Comment 4L NC    CBC with Auto Differential    Collection Time: 11/23/22  7:05 AM   Result Value Ref Range    WBC 28.4 (H) 4.0 - 10.5 K/CU MM    RBC 4.31 (L) 4.6 - 6.2 M/CU MM    Hemoglobin 14.3 13.5 - 18.0 GM/DL    Hematocrit 41.7 (L) 42 - 52 %    MCV 96.8 78 - 100 FL    MCH 33.2 (H) 27 - 31 PG    MCHC 34.3 32.0 - 36.0 %    RDW 16.3 (H) 11.7 - 14.9 %    Platelets 883 757 - 931 K/CU MM    MPV 10.4 7.5 - 11.1 FL    Myelocyte Percent 1 (H) 0.0 %    Bands Relative 20 (H) 5 - 11 %    Segs Relative 68.0 (H) 36 - 66 %    Basophils % 1.0 0 - 1 %    Lymphocytes % 5.0 (L) 24 - 44 %    Monocytes % 5.0 (H) 0 - 4 %    Myelocytes Absolute 0.28 K/CU MM    Bands Absolute 5.68 K/CU MM    Segs Absolute 19.3 K/CU MM    Basophils Absolute 0.3 K/CU MM    Lymphocytes Absolute 1.4 K/CU MM    Monocytes Absolute 1.4 K/CU MM    Differential Type MANUAL DIFFERENTIAL     Anisocytosis 1+     Toxic Granulation PRESENT     Dohle Bodies PRESENT    Basic Metabolic Panel w/ Reflex to MG    Collection Time: 11/23/22  7:05 AM   Result Value Ref Range    Sodium 128 (L) 135 - 145 MMOL/L    Potassium 4.3 3.5 - 5.1 MMOL/L    Chloride 98 (L) 99 - 110 mMol/L    CO2 20 (L) 21 - 32 MMOL/L    Anion Gap 10 4 - 16    BUN 32 (H) 6 - 23 MG/DL    Creatinine 1.2 0.9 - 1.3 MG/DL    Est, Glom Filt Rate >60 >60 mL/min/1.73m2    Glucose 118 (H) 70 - 99 MG/DL    Calcium 7.5 (L) 8.3 - 10.6 MG/DL   Legionella antigen, urine    Collection Time: 11/23/22  8:31 AM    Specimen: Urine   Result Value Ref Range    Legionella Urinary Ag NEGATIVE NEGATIVE   Urinalysis    Collection Time: 11/23/22  8:31 AM   Result Value Ref Range    Color, UA YELLOW YELLOW    Clarity, UA CLEAR CLEAR    Glucose, Urine NEGATIVE NEGATIVE MG/DL    Bilirubin Urine NEGATIVE NEGATIVE MG/DL    Ketones, Urine NEGATIVE NEGATIVE MG/DL    Specific Gravity, UA 1.015 1.001 - 1.035    Blood, Urine NEGATIVE NEGATIVE    pH, Urine 5.5 5.0 - 8.0    Protein, UA TRACE (A) NEGATIVE MG/DL    Urobilinogen, Urine 2.0 (H) 0.2 - 1.0 MG/DL    Nitrite Urine, Quantitative NEGATIVE NEGATIVE    Leukocyte Esterase, Urine NEGATIVE NEGATIVE   Osmolality, urine    Collection Time: 11/23/22  8:31 AM   Result Value Ref Range    Osmolality, Ur 482 292 - 1090 MOS/L   Sodium, urine, random    Collection Time: 11/23/22  8:31 AM   Result Value Ref Range    Sodium, Ur 10 (L) 35 - 167 MMOL/L   Microscopic Urinalysis    Collection Time: 11/23/22  8:31 AM   Result Value Ref Range    RBC, UA <1 0 - 3 /HPF    WBC, UA <1 0 - 2 /HPF    Bacteria, UA NEGATIVE NEGATIVE /HPF    Squam Epithel, UA <1 /HPF    Trichomonas, UA NONE SEEN NONE SEEN /HPF        Imaging/Diagnostics Last 24 Hours   XR CHEST PORTABLE    Result Date: 11/22/2022  EXAMINATION: ONE XRAY VIEW OF THE CHEST 11/22/2022 9:13 pm COMPARISON: None. HISTORY: ORDERING SYSTEM PROVIDED HISTORY: cough TECHNOLOGIST PROVIDED HISTORY: Reason for exam:->cough Reason for Exam: cough FINDINGS: Frontal portable view of the chest.  Normal lung volume. Multifocal bilateral patchy opacities, most pronounced in the right upper lobe. No definite pleural effusion or pneumothorax. Normal cardiomediastinal silhouette and great vessels. Multilevel degenerative disc disease.  Degenerative changes in the bilateral shoulders. Multifocal bilateral patchy opacities are most pronounced in the right upper lobe. Differential considerations include multifocal pneumonia and asymmetric pulmonary edema. Follow-up PA and lateral chest radiographs 6 weeks after the onset of appropriate medical therapy may be helpful to document improvement and exclude underlying malignant potential.     CTA PULMONARY W CONTRAST    Result Date: 11/23/2022  EXAMINATION: CTA OF THE CHEST 11/23/2022 9:27 am TECHNIQUE: CTA of the chest was performed after the administration of intravenous contrast.  Multiplanar reformatted images are provided for review. MIP images are provided for review. Automated exposure control, iterative reconstruction, and/or weight based adjustment of the mA/kV was utilized to reduce the radiation dose to as low as reasonably achievable. COMPARISON: Chest radiograph 10/22/2022. HISTORY: ORDERING SYSTEM PROVIDED HISTORY: [facility] TECHNOLOGIST PROVIDED HISTORY: Reason for exam:->[facility] Reason for Exam: [facility] Additional signs and symptoms: COUGH, SOB Relevant Medical/Surgical History: 75ML ISOVUE 370/ GFR>60 FINDINGS: Pulmonary Arteries: Streak artifact and motion limits evaluation of the segmental and subsegmental arteries. No central pulmonary embolus is identified. Mediastinum: The heart is within normal limits in size. No pericardial effusion effusion. Mild atherosclerosis of the aorta. There is atherosclerosis of the coronary arteries. No acute aortic abnormality identified. There are enlarged subcarinal and bilateral hilar lymph nodes measuring above 1.0 cm short axis. Patulous esophagus. Lungs/pleura: The trachea and central bronchi appear patent. There are ground-glass opacities within the peripheral and posterior right upper lobe, anterior right middle lobe, and within the dependent portions of the bilateral lower lobes. Dense consolidations within the lung bases favor atelectasis. No pleural effusion or pneumothorax. Motion limits evaluation of the fine anatomic detail. Upper Abdomen: No acute abnormality within the upper abdomen definitively visualized. Surgical clips are seen within the left upper quadrant. The left adrenal gland is not visualized. Diverticulosis of the large bowel. Soft Tissues/Bones: Multifocal degenerative changes of the osseous structures. Diffuse demineralization. No discrete acute soft tissue abnormality. No evidence of central pulmonary embolus. The peripheral arteries are limited in evaluation. Multifocal ground-glass and dense airspace consolidations, concerning for infection. Follow-up imaging to document resolution is recommended. Lymphadenopathy, nonspecific and possibly reactive.        Electronically signed by Arianne Suggs MD on 11/23/2022 at 1:23 PM

## 2022-11-23 NOTE — ED NOTES
Patient family called and asked the patient be tested for RSV because it's been going around.       Venkat Adams RN  11/23/22 [id number]

## 2022-11-23 NOTE — PROGRESS NOTES
Lab called and notified this RN that the pt's respiratory panel came back positive for RSV. Perfect serve sent to MD at this time.

## 2022-11-23 NOTE — CARE COORDINATION
Chart reviewed. CM attempted to meet with patient x2 and other disciplines were at bedside. Patient is from home with wife. He is droplet isolation. He is on 6 liters of heated heated high flow oxygen. CM attempted to call the patient's wife and left a VM. Patient does not have a PCP on file but [facility]. He has insurance. CM will follow.

## 2022-11-23 NOTE — ED NOTES
ED TO INPATIENT SBAR HANDOFF    Patient Name: Petar Ashleyus   :    65 y.o. MRN:  [id number]  Preferred Name  [address] [place] [address]  ED Room #:  IQ25/MY-81  Family/Caregiver Present will be back this am  Restraints no   Sitter no   Sepsis Risk Score Sepsis Risk Score: 3.18    Situation  Code Status: Full Code No additional code details. Allergies: Patient has no known allergies. Weight: Patient Vitals for the past 96 hrs (Last 3 readings):   Weight   22 1929 160 lb (72.6 kg)     Arrived from: home  Chief Complaint:   Chief Complaint   Patient presents with    Shortness of Breath     X4 days    Cough     Hospital Problem/Diagnosis:  Principal Problem:    Acute respiratory failure with hypoxia ([address]  Resolved Problems:    * No resolved hospital problems. *    Imaging:   XR CHEST PORTABLE   Final Result   Multifocal bilateral patchy opacities are most pronounced in the right upper   lobe. Differential considerations include multifocal pneumonia and   asymmetric pulmonary edema.       Follow-up PA and lateral chest radiographs 6 weeks after the onset of   appropriate medical therapy may be helpful to document improvement and   exclude underlying malignant potential.           Abnormal labs:   Abnormal Labs Reviewed   CBC WITH AUTO DIFFERENTIAL - Abnormal; Notable for the following components:       Result Value    WBC 24.0 (*)     MCH 32.9 (*)     RDW 16.1 (*)     Myelocyte Percent 1 (*)     Segs Relative 80.0 (*)     Lymphocytes % 6.0 (*)     All other components within normal limits   COMPREHENSIVE METABOLIC PANEL W/ REFLEX TO MG FOR LOW K - Abnormal; Notable for the following components:    Sodium 126 (*)     Chloride 93 (*)     CO2 20 (*)     BUN 33 (*)     Est, Glom Filt Rate 56 (*)     Glucose 113 (*)     Albumin 2.5 (*)     Total Bilirubin 1.2 (*)     AST 49 (*)     Alkaline Phosphatase 203 (*)     All other components within normal limits   BRAIN NATRIURETIC PEPTIDE - Abnormal; Notable for the following components:    Pro-.6 (*)     All other components within normal limits   LACTIC ACID - Abnormal; Notable for the following components:    Lactate 2.4 (*)     All other components within normal limits   BLOOD GAS, ARTERIAL - Abnormal; Notable for the following components:    pCO2, Arterial 22.0 (*)     pO2, Arterial 53 (*)     Base Excess 7 (*)     HCO3, Arterial 15.3 (*)     CO2 Content 16.0 (*)     O2 Sat 88.0 (*)     All other components within normal limits     Critical values: yes     Abnormal Assessment Findings: short of breath, rhonchi    Background  History: History reviewed. No pertinent past medical history.     Assessment    Vitals/MEWS: MEWS Score: 3  Level of Consciousness: Alert (0)   Vitals:    11/23/22 0212 11/23/22 0231 11/23/22 0302 11/23/22 0632   BP:  126/66 102/81 (!) 116/59   Pulse: (!) 108 89 (!) 108 72   Resp: 26  24 17   Temp:   97.8 °F (36.6 °C)    TempSrc:   Oral    SpO2: 97% 96% 95% 97%   Weight:       Height:         FiO2 (%): nasal cannula for respiratory distress- vapotherm  O2 Flow Rate: O2 Device: Heated high flow cannula (vapotherm) O2 Flow Rate (L/min): 6 L/min  Cardiac Rhythm:    Pain Assessment:  [] Verbal [] Vera Bumpers Scale  Pain Scale: Pain Assessment  Pain Level: 7  Pain Location: Generalized  Last documented pain score (0-10 scale) Pain Level: 7  Last documented pain medication administered:   Mental Status: oriented and alert  NIH Score:    C-SSRS: Risk of Suicide: No Risk  Bedside swallow:    Batsheva Coma Scale (GCS): Batsheva Coma Scale  Eye Opening: Spontaneous  Best Verbal Response: Oriented  Best Motor Response: Obeys commands  Batsheva Coma Scale Score: 15  Active LDA's:   Peripheral IV 11/22/22 Right Forearm (Active)       Peripheral IV 11/22/22 Left Forearm (Active)     PO Status: Regular  Pertinent or High Risk Medications/Drips: no   o If Yes, please provide details:   Pending Blood Product Administration: no     You may also review the ED PT Care Timeline found under the Summary Nursing Index tab.     Recommendation    Pending orders   Plan for Discharge (if known): home  Additional Comments:    If any further questions, please call Sending RN at [phone]    Electronically signed by: Electronically signed by Vito Gao RN on 11/23/2022 at 7:07 AM     Vito Gao RN  11/23/22 [id number]

## 2022-11-23 NOTE — H&P
History and Physical      Name:  Diana Chambers /Age/Sex:   (73 y.o. male)   MRN & CSN:  [id number] & [id number] Encounter Date/Time: 2022 11:52 PM EST   Location:  ED22/ED-22 PCP: No primary care provider on file. Hospital Day: 1    Assessment and Plan:     #. Acute respiratory failure with hypoxia probably secondary to pneumonia, COPD exacerbation  -Patient reports that he is not on home oxygen  -As per ER physician patient was initially saturating 90-93%, oxygen saturation was down to 80s. Was placed on 2 L of oxygen.  -Patient's oxygen saturation gradually went up to 6 L. -VBG-pH 7.44, PCO2 22, PO2 53, HCO3 15.3.  -Monitor and wean off oxygen as tolerated. -Chest x-ray-multifocal bilateral patchy opacities-most pronounced in the right upper lobe-differential considerations include multifocal pneumonia and asymmetric pulmonary edema. #.  Pneumonia  -Patient has leukocytosis, lactic acid 2.4, procalcitonin 73.17  -Rapid influenza, COVID-19 Negative  -Urine Streptococcus pneumonia antigen, Legionella antigen ordered.  -Sputum culture ordered.  -Blood cultures done in ED.  -Continue ceftriaxone, azithromycin. #.  COPD exacerbation  -Patient has wheezing, congested on examination  -Continue IV steroids, bronchodilator therapy.  -Patient reports he quit smoking 7 years ago. #.  Patient meets sepsis criteria at presentation  -Tachycardia, tachypnea, lactic acidosis, leukocytosis. -Gave only a liter of fluid bolus, due to concern for fluid overload. #.  Hyponatremia  -Continue IV fluids and repeat BMP    #. There is a concern that patient could be having pulmonary edema and overall picture consistent with CHF.   -proBNP 988, Na 126 patient does not appear to be fluid overloaded  -2D echo ordered to evaluate left ventricular function  -If patient symptoms does not improve, would consider CT chest.    #.  Mild renal insufficiency versus CKD  -Patient has creatinine 1.28- 3/2022 as per Care everywhere  -Creatinine 1.3 today  -Monitor with repeat lab work. #.  Hyperlipidemia-on fish oil, simvastatin    #. Macular degeneration - AREDS    #. History of splenectomy    #. GERD-on Protonix    #. Alcohol use  -Patient admits to drinking 3-4 beers/2-3 times per week. -Monitor for withdrawal.    Disposition:   Current Living situation: home    Diet Regular   DVT Prophylaxis [x] Lovenox, []  Heparin, [] SCDs, [] Ambulation,  [] Eliquis, [] Xarelto   Code Status  FULL   Surrogate Decision Maker/ POA      History from:   EMR, patient. History of Present Illness:     Chief Complaint: Acute respiratory failure with hypoxia ([facility] [address]  Louis Steele is a 65 y.o. male with hyperlipidemia, macular degeneration, GERD, CKD stage II, history of splenectomy, chronic alcohol use presented to ED with complaints of shortness of breath. Patient complains of cough, with clear sputum, shortness of breath for the last 4 days. Patient denies any fever but admits to having chills. Patient also complained of generalized weakness. Patient denied any headache, visual disturbance, nausea, vomiting, abdominal pain, denied any urinary complaints, denied any constipation or diarrhea. Patient denied any bilateral lower extremity swelling. At presentation patient was noted to have /79, , RR 20, temp 97.7, saturating 93% on room air. Lab work significant for sodium 126, CO2 20, BUN 33, creatinine 1.3, lactic acid 2.4, proBNP 988, troponin<0.010, WBC 24, albumin 2.5, AST 49, total bilirubin 1.2. VBG-pH 7.45, PCO2 22, PO2 53, HCO3 15.3. Rapid influenza, COVID-19 negative. Chest x-ray-multifocal bilateral patchy opacities more pronounced in the right upper lobe, differential considerations include multifocal pneumonia and asymmetric pulmonary edema. Patient received ceftriaxone, azithromycin in ED.     Review of Systems: Need 10 Elements   10 point review of systems conducted and pertinent positives and negatives as per HPI. Objective: Intake/Output Summary (Last 24 hours) at 11/22/2022 2352  Last data filed at 11/22/2022 2315  Gross per 24 hour   Intake 50 ml   Output --   Net 50 ml      Vitals:   Vitals:    11/22/22 2127 11/22/22 2131 11/22/22 2132 11/22/22 2145   BP: 118/81 128/69     Pulse: 94 93 92 94   Resp: 29 29  (!) 32   Temp:       TempSrc:       SpO2:  93%  90%   Weight:       Height:           Medications Prior to Admission   Reviewed medications with patient, reviewed EMR    Prior to Admission medications    Medication Sig Start Date End Date Taking? Authorizing Provider   Multiple Vitamins-Minerals (THERAPEUTIC MULTIVITAMIN-MINERALS) tablet Take 1 tablet by mouth daily   Yes Historical Provider, MD   pantoprazole (PROTONIX) 20 MG tablet Take 20 mg by mouth daily   Yes Historical Provider, MD       Physical Exam: Need 8 Elements   Physical Exam     GEN  -Awake, alert, sitting at the edge of bed. EYES   -PERRL. HENT  -MM are moist.   RESP  -intermittent wheezing, congestion  C/V  -S1/S2 auscultated, tachycardia without appreciable M/R/G. No JVD or carotid bruits. Peripheral pulses equal bilaterally and palpable. No peripheral edema. No reproducible chest wall tenderness. GI  -Abdomen is soft, non-distended, no significant tenderness. No masses or guarding. + BS in all quadrants. Rectal exam deferred.   -No CVA tenderness. Bonds catheter is not present. MS  -B/L extremities - No gross joint deformities. No swelling, intact sensation symmetrical.   SKIN  -Normal coloration, warm, dry. NEURO  - Awake, alert, oriented x 3, no focal deficits. PSYC  - Appropriate affect. Past Medical History:   Reviewed patient's past medical, surgical, social, family history and allergies.       PMHx hyperlipidemia, GERD, macular degeneration, CKD stage II  PSHX: Cholecystectomy, splenectomy  Allergies: No known drug allergies  Fam HX: Reviewed and noncontributory  Soc HX: Quit smoking 7 years ago, admits to drinking 3-4 beers/2-3 times per week, denies any illicit drug use  Social History     Socioeconomic History    Marital status:      Spouse name: None    Number of children: None    Years of education: None    Highest education level: None   Tobacco Use    Smoking status: Former     Types: Cigarettes   Vaping Use    Vaping Use: Some days   Substance and Sexual Activity    Alcohol use: Not Currently       Medications:   Medications:    [START ON 11/23/2022] ipratropium-albuterol  1 ampule Inhalation Once      Infusions:   PRN Meds:      Labs      CBC:   Recent Labs     11/22/22 2120   WBC 24.0*   HGB 16.3        BMP:    Recent Labs     11/22/22 2145   *   K 4.7   CL 93*   CO2 20*   BUN 33*   CREATININE 1.3   GLUCOSE 113*     Hepatic:   Recent Labs     11/22/22 2145   AST 49*   ALT 32   BILITOT 1.2*   ALKPHOS 203*     Lipids: No results found for: CHOL, HDL, TRIG  Hemoglobin A1C: No results found for: LABA1C  TSH: No results found for: TSH  Troponin:   Lab Results   Component Value Date/Time    TROPONINT <0.010 11/22/2022 09:45 PM     Lactic Acid: No results for input(s): LACTA in the last 72 hours. BNP:   Recent Labs     11/22/22 2145   PROBNP 988.6*     UA:No results found for: Vicky Natter, PHUR, LABCAST, WBCUA, RBCUA, MUCUS, TRICHOMONAS, YEAST, BACTERIA, CLARITYU, SPECGRAV, LEUKOCYTESUR, UROBILINOGEN, BILIRUBINUR, BLOODU, GLUCOSEU, KETUA, AMORPHOUS  Urine Cultures: No results found for: LABURIN  Blood Cultures: No results found for: BC  No results found for: BLOODCULT2  Organism: No results found for: ORG    Imaging/Diagnostics Last 24 Hours   XR CHEST PORTABLE    Result Date: 11/22/2022  EXAMINATION: ONE XRAY VIEW OF THE CHEST 11/22/2022 9:13 pm COMPARISON: None. HISTORY: ORDERING SYSTEM PROVIDED HISTORY: cough TECHNOLOGIST PROVIDED HISTORY: Reason for exam:->cough Reason for Exam: cough FINDINGS: Frontal portable view of the chest.  Normal lung volume.   Multifocal bilateral patchy opacities, most pronounced in the right upper lobe. No definite pleural effusion or pneumothorax. Normal cardiomediastinal silhouette and great vessels. Multilevel degenerative disc disease. Degenerative changes in the bilateral shoulders. Multifocal bilateral patchy opacities are most pronounced in the right upper lobe. Differential considerations include multifocal pneumonia and asymmetric pulmonary edema. Follow-up PA and lateral chest radiographs 6 weeks after the onset of appropriate medical therapy may be helpful to document improvement and exclude underlying malignant potential.       Personally reviewed Lab Studies, Imaging, and discussed case with ED physician.     Electronically signed by Army Migue MD on 11/22/2022 at 11:52 PM

## 2022-11-23 NOTE — ED NOTES
ED TO INPATIENT SBAR HANDOFF    Patient Name: Pedro Arnold   :  [id number]  65 y.o. MRN:  [id number]  Preferred Name  [place]  ED Room #:  EO61/QQ-50  Family/Caregiver Present yes - wife went home for a little bit  Restraints no   Sitter no   Sepsis Risk Score Sepsis Risk Score: 5.89    Situation  Code Status: No Order No additional code details. Allergies: Patient has no known allergies. Weight: Patient Vitals for the past 96 hrs (Last 3 readings):   Weight   22 1929 160 lb (72.6 kg)     Arrived from: home  Chief Complaint:   Chief Complaint   Patient presents with    Shortness of Breath     X4 days    Cough     Hospital Problem/Diagnosis:  Active Problems:    * No active hospital problems. *  Resolved Problems:    * No resolved hospital problems. *    Imaging:   XR CHEST PORTABLE   Final Result   Multifocal bilateral patchy opacities are most pronounced in the right upper   lobe. Differential considerations include multifocal pneumonia and   asymmetric pulmonary edema.       Follow-up PA and lateral chest radiographs 6 weeks after the onset of   appropriate medical therapy may be helpful to document improvement and   exclude underlying malignant potential.           Abnormal labs:   Abnormal Labs Reviewed   CBC WITH AUTO DIFFERENTIAL - Abnormal; Notable for the following components:       Result Value    WBC 24.0 (*)     MCH 32.9 (*)     RDW 16.1 (*)     Myelocyte Percent 1 (*)     Segs Relative 80.0 (*)     Lymphocytes % 6.0 (*)     All other components within normal limits   COMPREHENSIVE METABOLIC PANEL W/ REFLEX TO MG FOR LOW K - Abnormal; Notable for the following components:    Sodium 126 (*)     Chloride 93 (*)     CO2 20 (*)     BUN 33 (*)     Est, Glom Filt Rate 56 (*)     Glucose 113 (*)     Albumin 2.5 (*)     Total Bilirubin 1.2 (*)     AST 49 (*)     Alkaline Phosphatase 203 (*)     All other components within normal limits   BRAIN NATRIURETIC PEPTIDE - Abnormal; Notable for the following components:    Pro-.6 (*)     All other components within normal limits     Critical values: yes,lactic & high WBC    Abnormal Assessment Findings: very dyspneic at rest, rhonchi    Background  History: History reviewed. No pertinent past medical history. Assessment    Vitals/MEWS:        Vitals:    11/22/22 2127 11/22/22 2131 11/22/22 2132 11/22/22 2145   BP: 118/81 128/69     Pulse: 94 93 92 94   Resp: 29 29  (!) 32   Temp:       TempSrc:       SpO2:  93%  90%   Weight:       Height:         FiO2 (%): nasal cannula for respiratory distress and increased work of breathing  O2 Flow Rate:      Cardiac Rhythm:    Pain Assessment:  [x] Verbal [] Rd Bonus Scale  Pain Scale:    Last documented pain score (0-10 scale)    Last documented pain medication administered:   Mental Status: oriented and alert  NIH Score:    C-SSRS: Risk of Suicide: No Risk  Bedside swallow:    Batsheva Coma Scale (GCS): [place] Coma Scale  Eye Opening: Spontaneous  Best Verbal Response: Oriented  Best Motor Response: Obeys commands  Batsheva Coma Scale Score: 15  Active LDA's:   Peripheral IV 11/22/22 Right Forearm (Active)       Peripheral IV 11/22/22 Left Forearm (Active)     PO Status: Regular  Pertinent or High Risk Medications/Drips: no   o If Yes, please provide details:   Pending Blood Product Administration: no     You may also review the ED PT Care Timeline found under the Summary Nursing Index tab.     Recommendation    Pending orders   Plan for Discharge (if known): home  Additional Comments:   If any further questions, please call Sending RN at [phone]    Electronically signed by: Electronically signed by Gonsalo Marrero RN on 11/22/2022 at 11:15 PM     Gonsalo Marrero RN  11/22/22 [id number] [facility]  11/22/22 [id number]

## 2022-11-23 NOTE — ED NOTES
Pt w/ increasing respiratory distress. Hospitalist notified.   Pt up to 305 Alma Rankin, RN  11/23/22 [id number]

## 2022-11-23 NOTE — ED PROVIDER NOTES
Triage Chief Complaint:   Shortness of Breath (X4 days) and Cough    [place]:  Hershal Landau is a 65 y.o. male that presents with 4 days of illness. The patient states that he has had a cough with some phlegm production, generalized weakness, fatigue, shortness of breath, nasal congestion, bilateral ear ache, chest pain with coughing. Denies any abdominal pain, nausea, vomiting, diarrhea. Wife recently ill with pneumonia. Denies any history of heart/lung issues. Denies recent medication changes. He has been taking NyQuil and Tylenol without significant improvement in symptoms. States that he has been having chills and sweats but no documented fevers. No other acute complaints. ROS:  At least 10 systems reviewed and otherwise acutely negative except as in the [address] History reviewed. No pertinent past medical history. Past Surgical History:   Procedure Laterality Date    CHOLECYSTECTOMY       History reviewed. No pertinent family history.   Social History     Socioeconomic History    Marital status:      Spouse name: Not on file    Number of children: Not on file    Years of education: Not on file    Highest education level: Not on file   Occupational History    Not on file   Tobacco Use    Smoking status: Former     Types: Cigarettes    Smokeless tobacco: Not on file   Vaping Use    Vaping Use: Some days   Substance and Sexual Activity    Alcohol use: Not Currently    Drug use: Not on file    Sexual activity: Not on file   Other Topics Concern    Not on file   Social History Narrative    Not on file     Social Determinants of Health     Financial Resource Strain: Not on file   Food Insecurity: Not on file   Transportation Needs: Not on file   Physical Activity: Not on file   Stress: Not on file   Social Connections: Not on file   Intimate Partner Violence: Not on file   Housing Stability: Not on file     Current Facility-Administered Medications   Medication Dose Route Frequency Provider Last Rate Last Admin    cefTRIAXone (ROCEPHIN) 1,000 mg in dextrose 5 % 50 mL IVPB mini-bag  1,000 mg IntraVENous Once Mago Pereira  mL/hr at 11/22/22 2224 1,000 mg at 11/22/22 2224     Current Outpatient Medications   Medication Sig Dispense Refill    Multiple Vitamins-Minerals (THERAPEUTIC MULTIVITAMIN-MINERALS) tablet Take 1 tablet by mouth daily      pantoprazole (PROTONIX) 20 MG tablet Take 20 mg by mouth daily       No Known Allergies    Nursing Notes Reviewed    Physical Exam:  ED Triage Vitals [11/22/22 1929]   Enc Vitals Group      /79      Heart Rate (!) 116      Resp 20      Temp 97.7 °F (36.5 °C)      Temp Source Oral      SpO2 93 %      Weight 160 lb (72.6 kg)      Height 6' (1.829 m)      Head Circumference       Peak Flow       Pain Score       Pain Loc       Pain Edu? Excl. in [address]? GENERAL APPEARANCE: Awake and alert. Cooperative. No acute distress. HEAD: Normocephalic. Atraumatic. EYES: EOM's grossly intact. Sclera anicteric. ENT: Mucous membranes are moist. Tolerates saliva. No trismus. NECK: Supple. No meningismus. Trachea midline. HEART: Tachycardic. Radial pulses 2+. LUNGS: Respirations unlabored. Diminished lung sounds bilaterally. Crackles RUL  ABDOMEN: Soft. Non-tender. No guarding or rebound. EXTREMITIES: No acute deformities. No edema  SKIN: Warm and dry. NEUROLOGICAL: No gross facial drooping. Moves all 4 extremities spontaneously. PSYCHIATRIC: Normal mood.     I have reviewed and interpreted all of the currently available lab results from this visit (if applicable):  Results for orders placed or performed during the hospital encounter of 11/22/22   COVID-19, Rapid    Specimen: Nasopharyngeal   Result Value Ref Range    Source UNKNOWN     SARS-CoV-2, NAAT NOT DETECTED NOT DETECTED   Rapid Flu Swab    Specimen: Nasopharyngeal   Result Value Ref Range    Rapid Influenza A Ag NEGATIVE NEGATIVE    Rapid Influenza B Ag NEGATIVE NEGATIVE   CBC with Auto Differential   Result Value Ref Range    WBC 24.0 (H) 4.0 - 10.5 K/CU MM    RBC 4.95 4.6 - 6.2 M/CU MM    Hemoglobin 16.3 13.5 - 18.0 GM/DL    Hematocrit 47.7 42 - 52 %    MCV 96.4 78 - 100 FL    MCH 32.9 (H) 27 - 31 PG    MCHC 34.2 32.0 - 36.0 %    RDW 16.1 (H) 11.7 - 14.9 %    Platelets 805 493 - 243 K/CU MM    MPV 10.7 7.5 - 11.1 FL    Myelocyte Percent 1 (H) 0.0 %    Bands Relative 9 5 - 11 %    Segs Relative 80.0 (H) 36 - 66 %    Lymphocytes % 6.0 (L) 24 - 44 %    Monocytes % 4.0 0 - 4 %    Myelocytes Absolute 0.24 K/CU MM    Bands Absolute 2.16 K/CU MM    Segs Absolute 19.2 K/CU MM    Lymphocytes Absolute 1.4 K/CU MM    Monocytes Absolute 1.0 K/CU MM    Differential Type MANUAL DIFFERENTIAL     RBC Morphology FEW     Toxic Granulation PRESENT    SPECIMEN REJECTION   Result Value Ref Range    Rejected Test CHEM SPECIMEN HEMOLYZED     Reason for Rejection UNABLE TO PERFORM TESTING:    Comprehensive Metabolic Panel w/ Reflex to MG   Result Value Ref Range    Sodium 126 (L) 135 - 145 MMOL/L    Potassium 4.7 3.5 - 5.1 MMOL/L    Chloride 93 (L) 99 - 110 mMol/L    CO2 20 (L) 21 - 32 MMOL/L    BUN 33 (H) 6 - 23 MG/DL    Creatinine 1.3 0.9 - 1.3 MG/DL    Est, Glom Filt Rate 56 (L) >60 mL/min/1.73m2    Glucose 113 (H) 70 - 99 MG/DL    Calcium 8.3 8.3 - 10.6 MG/DL    Albumin 2.5 (L) 3.4 - 5.0 GM/DL    Total Protein 6.7 6.4 - 8.2 GM/DL    Total Bilirubin 1.2 (H) 0.0 - 1.0 MG/DL    ALT 32 10 - 40 U/L    AST 49 (H) 15 - 37 IU/L    Alkaline Phosphatase 203 (H) 40 - 129 IU/L    Anion Gap 13 4 - 16   Brain Natriuretic Peptide   Result Value Ref Range    Pro-.6 (H) <300 PG/ML   Troponin   Result Value Ref Range    Troponin T <0.010 <0.01 NG/ML      Radiographs (if obtained):  [] The following radiograph was interpreted by myself in the absence of a radiologist:  [x] Radiologist's Report Reviewed:    EKG (if obtained): (All EKG's are interpreted by myself in the absence of a cardiologist)  12 lead EKG as interpreted by me reveals sinus tachycardia. Axis is normal. There are no ischemic ST elevations or other suspicious ST changes;  QRS interval is narrow, QT interval is not prolonged. Final interpretation: Sinus tachycardia    MDM:  Plan of care is discussed thoroughly with the patient and family if present. If performed, all imaging and lab work also discussed with patient. All relevant prior results and chart reviewed if available. Patient presents as above. He is in no acute distress and presents with symptoms most consistent with likely viral syndrome. Lung sounds are clear bilaterally. He is not hypoxic. Patient is mildly tachycardic. I have lower concern at this time for PE, ACS, other acute cardiopulmonary emergent process based on overall presentation. X-ray is consistent with multifocal pneumonia. Patient does start to have oxygen saturations down into the 80s and was placed on 2 L nasal cannula. Started on Rocephin and azithromycin. Blood cultures and lactate collected. Remainder of patient's work-up significant for leukocytosis. He also has hyponatremia in the setting of normal renal function. Troponin within normal limits. Metabolic work-up largely unremarkable otherwise. He is admitted to the hospitalist for further management. Is this patient to be included in the SEP-1 Core Measure due to severe sepsis or septic shock? No   Exclusion criteria - the patient is NOT to be included for SEP-1 Core Measure due to:  May have criteria for sepsis, but does not meet criteria for severe sepsis or septic shock        Clinical Impression:  1. Community acquired pneumonia, unspecified laterality    2.  Hyponatremia      (Please note that portions of this note may have been completed with a voice recognition program. Efforts were made to edit the dictations but occasionally words are mis-transcribed.)    MD Molly Louis MD  11/22/22 MD Sol  11/22/22 [id number]

## 2022-11-23 NOTE — ED NOTES
RT at bedside for ABG. Pt reports some improvement after solumedrol.   02 down to 3L     Gregory Palacios RN  11/23/22 2018

## 2022-11-23 NOTE — ED NOTES
Patient assisted back into bed; reports feeling significantly better, appears much more comfortable than on prior assessment. Pt on vapotherm per RT.      Amairani Cabral RN  11/23/22 [id number]

## 2022-11-24 LAB
ALBUMIN SERPL-MCNC: 2 GM/DL (ref 3.4–5)
ALP BLD-CCNC: 197 IU/L (ref 40–128)
ALT SERPL-CCNC: 43 U/L (ref 10–40)
ANION GAP SERPL CALCULATED.3IONS-SCNC: 13 MMOL/L (ref 4–16)
AST SERPL-CCNC: 84 IU/L (ref 15–37)
BASOPHILS ABSOLUTE: 0.1 K/CU MM
BASOPHILS RELATIVE PERCENT: 0.7 % (ref 0–1)
BILIRUB SERPL-MCNC: 0.4 MG/DL (ref 0–1)
BUN BLDV-MCNC: 45 MG/DL (ref 6–23)
CALCIUM SERPL-MCNC: 7.2 MG/DL (ref 8.3–10.6)
CHLORIDE BLD-SCNC: 95 MMOL/L (ref 99–110)
CO2: 19 MMOL/L (ref 21–32)
CREAT SERPL-MCNC: 1.1 MG/DL (ref 0.9–1.3)
DIFFERENTIAL TYPE: ABNORMAL
EKG ATRIAL RATE: 116 BPM
EKG DIAGNOSIS: NORMAL
EKG P AXIS: 51 DEGREES
EKG P-R INTERVAL: 128 MS
EKG Q-T INTERVAL: 338 MS
EKG QRS DURATION: 86 MS
EKG QTC CALCULATION (BAZETT): 469 MS
EKG R AXIS: 17 DEGREES
EKG T AXIS: 63 DEGREES
EKG VENTRICULAR RATE: 116 BPM
EOSINOPHILS ABSOLUTE: 0 K/CU MM
EOSINOPHILS RELATIVE PERCENT: 0.2 % (ref 0–3)
GFR SERPL CREATININE-BSD FRML MDRD: >60 ML/MIN/1.73M2
GLUCOSE BLD-MCNC: 117 MG/DL (ref 70–99)
HCT VFR BLD CALC: 48.7 % (ref 42–52)
HEMOGLOBIN: 15.6 GM/DL (ref 13.5–18)
IMMATURE NEUTROPHIL %: 3.3 % (ref 0–0.43)
LYMPHOCYTES ABSOLUTE: 1.1 K/CU MM
LYMPHOCYTES RELATIVE PERCENT: 7 % (ref 24–44)
MCH RBC QN AUTO: 32.4 PG (ref 27–31)
MCHC RBC AUTO-ENTMCNC: 32 % (ref 32–36)
MCV RBC AUTO: 101.2 FL (ref 78–100)
MONOCYTES ABSOLUTE: 0.4 K/CU MM
MONOCYTES RELATIVE PERCENT: 2.4 % (ref 0–4)
NUCLEATED RBC %: 0.8 %
PDW BLD-RTO: 17.3 % (ref 11.7–14.9)
PLATELET # BLD: 297 K/CU MM (ref 140–440)
PMV BLD AUTO: 9.9 FL (ref 7.5–11.1)
POTASSIUM SERPL-SCNC: 5.2 MMOL/L (ref 3.5–5.1)
PROCALCITONIN: >100
RBC # BLD: 4.81 M/CU MM (ref 4.6–6.2)
SEGMENTED NEUTROPHILS ABSOLUTE COUNT: 13.3 K/CU MM
SEGMENTED NEUTROPHILS RELATIVE PERCENT: 86.4 % (ref 36–66)
SODIUM BLD-SCNC: 127 MMOL/L (ref 135–145)
TOTAL IMMATURE NEUTOROPHIL: 0.5 K/CU MM
TOTAL NUCLEATED RBC: 0.1 K/CU MM
TOTAL PROTEIN: 4.9 GM/DL (ref 6.4–8.2)
WBC # BLD: 15.4 K/CU MM (ref 4–10.5)

## 2022-11-24 PROCEDURE — 94761 N-INVAS EAR/PLS OXIMETRY MLT: CPT

## 2022-11-24 PROCEDURE — 6370000000 HC RX 637 (ALT 250 FOR IP): Performed by: STUDENT IN AN ORGANIZED HEALTH CARE EDUCATION/TRAINING PROGRAM

## 2022-11-24 PROCEDURE — 36415 COLL VENOUS BLD VENIPUNCTURE: CPT

## 2022-11-24 PROCEDURE — 6370000000 HC RX 637 (ALT 250 FOR IP): Performed by: INTERNAL MEDICINE

## 2022-11-24 PROCEDURE — 6360000002 HC RX W HCPCS: Performed by: INTERNAL MEDICINE

## 2022-11-24 PROCEDURE — 2580000003 HC RX 258: Performed by: INTERNAL MEDICINE

## 2022-11-24 PROCEDURE — 94664 DEMO&/EVAL PT USE INHALER: CPT

## 2022-11-24 PROCEDURE — 6360000002 HC RX W HCPCS: Performed by: STUDENT IN AN ORGANIZED HEALTH CARE EDUCATION/TRAINING PROGRAM

## 2022-11-24 PROCEDURE — 93010 ELECTROCARDIOGRAM REPORT: CPT | Performed by: INTERNAL MEDICINE

## 2022-11-24 PROCEDURE — 2580000003 HC RX 258: Performed by: STUDENT IN AN ORGANIZED HEALTH CARE EDUCATION/TRAINING PROGRAM

## 2022-11-24 PROCEDURE — 84145 PROCALCITONIN (PCT): CPT

## 2022-11-24 PROCEDURE — 80053 COMPREHEN METABOLIC PANEL: CPT

## 2022-11-24 PROCEDURE — 94640 AIRWAY INHALATION TREATMENT: CPT

## 2022-11-24 PROCEDURE — 2060000000 HC ICU INTERMEDIATE R&B

## 2022-11-24 PROCEDURE — 85025 COMPLETE CBC W/AUTO DIFF WBC: CPT

## 2022-11-24 PROCEDURE — 2700000000 HC OXYGEN THERAPY PER DAY

## 2022-11-24 RX ORDER — FUROSEMIDE 10 MG/ML
20 INJECTION INTRAMUSCULAR; INTRAVENOUS ONCE
Status: COMPLETED | OUTPATIENT
Start: 2022-11-24 | End: 2022-11-24

## 2022-11-24 RX ADMIN — Medication 2 PUFF: at 19:05

## 2022-11-24 RX ADMIN — ALBUTEROL SULFATE 2 PUFF: 90 AEROSOL, METERED RESPIRATORY (INHALATION) at 11:10

## 2022-11-24 RX ADMIN — DEXTROSE MONOHYDRATE 500 MG: 50 INJECTION, SOLUTION INTRAVENOUS at 10:34

## 2022-11-24 RX ADMIN — OMEGA-3-ACID ETHYL ESTERS 2000 MG: 1 CAPSULE, LIQUID FILLED ORAL at 10:20

## 2022-11-24 RX ADMIN — SODIUM CHLORIDE, PRESERVATIVE FREE 10 ML: 5 INJECTION INTRAVENOUS at 10:19

## 2022-11-24 RX ADMIN — FUROSEMIDE 20 MG: 10 INJECTION, SOLUTION INTRAVENOUS at 10:22

## 2022-11-24 RX ADMIN — ENOXAPARIN SODIUM 40 MG: 100 INJECTION SUBCUTANEOUS at 10:29

## 2022-11-24 RX ADMIN — PANTOPRAZOLE SODIUM 40 MG: 40 TABLET, DELAYED RELEASE ORAL at 05:55

## 2022-11-24 RX ADMIN — ATORVASTATIN CALCIUM 10 MG: 10 TABLET, FILM COATED ORAL at 10:20

## 2022-11-24 RX ADMIN — METHYLPREDNISOLONE SODIUM SUCCINATE 40 MG: 40 INJECTION, POWDER, FOR SOLUTION INTRAMUSCULAR; INTRAVENOUS at 02:05

## 2022-11-24 RX ADMIN — METHYLPREDNISOLONE SODIUM SUCCINATE 40 MG: 40 INJECTION, POWDER, FOR SOLUTION INTRAMUSCULAR; INTRAVENOUS at 10:20

## 2022-11-24 RX ADMIN — ALBUTEROL SULFATE 2 PUFF: 90 AEROSOL, METERED RESPIRATORY (INHALATION) at 19:06

## 2022-11-24 RX ADMIN — SODIUM CHLORIDE, PRESERVATIVE FREE 10 ML: 5 INJECTION INTRAVENOUS at 21:13

## 2022-11-24 RX ADMIN — METHYLPREDNISOLONE SODIUM SUCCINATE 40 MG: 40 INJECTION, POWDER, FOR SOLUTION INTRAMUSCULAR; INTRAVENOUS at 18:39

## 2022-11-24 RX ADMIN — CEFTRIAXONE SODIUM 2000 MG: 2 INJECTION, POWDER, FOR SOLUTION INTRAMUSCULAR; INTRAVENOUS at 21:14

## 2022-11-24 RX ADMIN — Medication 2 PUFF: at 15:09

## 2022-11-24 RX ADMIN — ALBUTEROL SULFATE 2 PUFF: 90 AEROSOL, METERED RESPIRATORY (INHALATION) at 07:14

## 2022-11-24 RX ADMIN — Medication 1 TABLET: at 10:20

## 2022-11-24 RX ADMIN — ALBUTEROL SULFATE 2 PUFF: 90 AEROSOL, METERED RESPIRATORY (INHALATION) at 15:07

## 2022-11-24 RX ADMIN — Medication 2 PUFF: at 11:12

## 2022-11-24 RX ADMIN — Medication 2000 UNITS: at 10:20

## 2022-11-24 RX ADMIN — Medication 2 PUFF: at 07:16

## 2022-11-24 RX ADMIN — ASPIRIN 81 MG: 81 TABLET, COATED ORAL at 10:21

## 2022-11-24 ASSESSMENT — PAIN SCALES - GENERAL: PAINLEVEL_OUTOF10: 0

## 2022-11-24 NOTE — PROGRESS NOTES
V2.0  [facility] [place] Note      Name:  Rufina Maldonado /Age/Sex:   (73 y.o. male)   MRN & CSN:  [id number] & [id number] Encounter Date/Time: 2022 1:23 PM EST    Location:  -A PCP: No primary care provider on file. Hospital Day: 3    Assessment and Plan:   Rufina Maldonado is a 65 y.o. male with pmh of hyperlipidemia, CKD stage II, chronic alcohol use, macular degeneration, history of splenectomy who presents with Acute respiratory failure with hypoxia ([address]    #. Acute respiratory failure with hypoxia probably secondary to pneumonia, COPD exacerbation  - Patient not on home oxygen, on presentation patient was requiring 2 L of oxygen and increased to 6 L, -VBG-pH 7.44, PCO2 22, PO2 53, HCO3 15.3.  - Chest x-ray-multifocal bilateral patchy opacities-most pronounced in the right upper lobe-differential considerations include multifocal pneumonia and asymmetric pulmonary edema. Patient still requiring 6 L of oxygen this morning and strep pneumonia antigen positive, Legionella negative obtained CT no PE, multifocal groundglass and dense airspace consolidations concerning for infection, follow-up on blood cultures, respiratory PCR positive for RSV, continue ceftriaxone, azithromycin, Solu-Medrol, pulmonary consulted will follow recommendations. # Severe sepsis secondary to above -improving  - Tachycardia, tachypnea, lactic acidosis, leukocytosis. - As above     #  Pneumonia  -Patient has leukocytosis, lactic acid 2.4-->1.8, procalcitonin 73.17-->100,  -Rapid influenza, COVID-19 Negative but respiratory PCR showed RSV positive, chest x-ray showed multifocal bilateral patchy opacities, CT no PE but multifocal groundglass and dense airspace consolidations  -Urine Streptococcus pneumonia antigen positive, Legionella antigen negative  -Sputum culture ordered  Pending  -Blood cultures done pending  -Continue ceftriaxone, azithromycin.      # COPD exacerbation  -Patient has wheezing, congested on examination  -Continue IV steroids, bronchodilator therapy.  -Patient reports he quit smoking 7 years ago. # Hyponatremia -stable  - Serum osmolality 291, will fluid restrict and give 1 dose of Lasix and watch    # There is a concern that patient could be having pulmonary edema and overall picture consistent with CHF. -proBNP 988, Na 126--.127 patient does not appear to be fluid overloaded  - Echocardiogram ordered pending     #. HAWA resolving  - Patient has creatinine 1.28- 3/2022 as per Care everywhere  - Creatinine 1.3 - 1.2--1.1     # Hyperlipidemia-on fish oil, simvastatin     # Macular degeneration - AREDS     # History of splenectomy     # GERD-on Protonix     # Alcohol use  - Patient admits to drinking 3-4 beers/2-3 times per week. - Monitor for withdrawal.      Diet ADULT DIET; Regular   DVT Prophylaxis [x] Lovenox, []  Heparin, [] SCDs, [] Ambulation,  [] Eliquis, [] Xarelto  [] Coumadin   Code Status Full Code   Disposition From: Home  Expected Disposition: Home  Estimated Date of Discharge: TBD  Patient requires continued admission due to sepsis   Surrogate Decision Maker/ POA Self     Subjective:     Chief Complaint: Shortness of Breath (X4 days) and Cough       Patient seen and examined at bedside. Still have cough, sputum and shortness of breath         Review of Systems:    Review of Systems    Constitutional: No fever no chills  HEENT: No headache no dizziness  Cardiovascular: No chest pain no palpitations  Respiratory: cough shortness of breath  Abdomen: No diarrhea, no nausea, no vomiting, no abdominal pain  Musculoskeletal: no Swelling  Neuro: No numbness or tingling  Skin: No rash      Objective:      Intake/Output Summary (Last 24 hours) at 11/24/2022 1022  Last data filed at 11/24/2022 7934  Gross per 24 hour   Intake 500 ml   Output 725 ml   Net -225 ml          Vitals:   Vitals:    11/24/22 0827   BP: (!) 115/104   Pulse: 95   Resp: 21   Temp: 97.4 °F (36.3 °C)   SpO2: 92%       Physical Exam:     General: Afebrile, mild distress and requiring 10L  Eyes: EOMI  ENT: neck supple  Cardiovascular: Regular rate. Respiratory: Air entry good bilaterally intermittent wheezing  Gastrointestinal: Soft, non tender  Genitourinary: no suprapubic tenderness  Musculoskeletal: No edema  Skin: warm, dry  Neuro: Alert. Psych: Mood appropriate.      Medications:   Medications:    furosemide  20 mg IntraVENous Once    sodium chloride flush  5-40 mL IntraVENous 2 times per day    enoxaparin  40 mg SubCUTAneous Daily    methylPREDNISolone  40 mg IntraVENous Q8H    Followed by    Tavon Perez ON 11/25/2022] predniSONE  40 mg Oral Daily    azithromycin  500 mg IntraVENous Q24H    aspirin  81 mg Oral Daily    therapeutic multivitamin-minerals  1 tablet Oral Daily    omega-3 acid ethyl esters  2,000 mg Oral Daily    pantoprazole  40 mg Oral Daily    atorvastatin  10 mg Oral Daily    Vitamin D  2,000 Units Oral Daily    cefTRIAXone (ROCEPHIN) IV  2,000 mg IntraVENous Q24H    ipratropium  2 puff Inhalation Q4H While awake    albuterol sulfate HFA  2 puff Inhalation Q4H While awake      Infusions:    sodium chloride       PRN Meds: sodium chloride flush, 5-40 mL, PRN  sodium chloride, , PRN  ondansetron, 4 mg, Q8H PRN   Or  ondansetron, 4 mg, Q6H PRN  polyethylene glycol, 17 g, Daily PRN  acetaminophen, 650 mg, Q6H PRN   Or  acetaminophen, 650 mg, Q6H PRN  ipratropium-albuterol, 1 ampule, Q4H PRN      Labs      Recent Results (from the past 24 hour(s))   Lactic Acid    Collection Time: 11/23/22  1:25 PM   Result Value Ref Range    Lactate 2.2 (HH) 0.4 - 2.0 mMOL/L   Osmolality    Collection Time: 11/23/22  1:25 PM   Result Value Ref Range    Osmolality 291 280 - 300 MOS/L   Respiratory Panel, Molecular, with COVID-19 (Restricted: peds pts or suitable admitted adults)    Collection Time: 11/23/22  3:07 PM    Specimen: Nasopharyngeal   Result Value Ref Range    Adenovirus Detection by PCR NOT DETECTED NOT DETECTED    Coronavirus 229E PCR NOT DETECTED NOT DETECTED    Coronavirus HKU1 PCR NOT DETECTED NOT DETECTED    Coronavirus NL63 PCR NOT DETECTED NOT DETECTED    Coronavirus OC43 PCR NOT DETECTED NOT DETECTED    SARS-CoV-2 NOT DETECTED NOT DETECTED    Human Metapneumovirus PCR NOT DETECTED NOT DETECTED    Rhinovirus Enterovirus PCR NOT DETECTED NOT DETECTED    Influenza A by PCR NOT DETECTED NOT DETECTED    Influenza A H1 Pandemic PCR NOT DETECTED NOT DETECTED    Influenza A H1 (2009) PCR NOT DETECTED NOT DETECTED    Influenza A H3 PCR NOT DETECTED NOT DETECTED    Influenza B by PCR NOT DETECTED NOT DETECTED    Parainfluenza 1 PCR NOT DETECTED NOT DETECTED    Parainfluenza 2 PCR NOT DETECTED NOT DETECTED    Parainfluenza 3 PCR NOT DETECTED NOT DETECTED    Parainfluenza 4 PCR NOT DETECTED NOT DETECTED    RSV PCR DETECTED BY PCR (A) NOT DETECTED    Bordetella parapertussis by PCR NOT DETECTED NOT DETECTED    B Pertussis by PCR NOT DETECTED NOT DETECTED    Chlamydophila Pneumonia PCR NOT DETECTED NOT DETECTED    Mycoplasma pneumo by PCR NOT DETECTED NOT DETECTED   Lactic Acid    Collection Time: 11/23/22  5:57 PM   Result Value Ref Range    Lactate 1.8 0.4 - 2.0 mMOL/L   CBC with Auto Differential    Collection Time: 11/24/22  4:46 AM   Result Value Ref Range    WBC 15.4 (H) 4.0 - 10.5 K/CU MM    RBC 4.81 4.6 - 6.2 M/CU MM    Hemoglobin 15.6 13.5 - 18.0 GM/DL    Hematocrit 48.7 42 - 52 %    .2 (H) 78 - 100 FL    MCH 32.4 (H) 27 - 31 PG    MCHC 32.0 32.0 - 36.0 %    RDW 17.3 (H) 11.7 - 14.9 %    Platelets 410 129 - 410 K/CU MM    MPV 9.9 7.5 - 11.1 FL    Differential Type AUTOMATED DIFFERENTIAL     Segs Relative 86.4 (H) 36 - 66 %    Lymphocytes % 7.0 (L) 24 - 44 %    Monocytes % 2.4 0 - 4 %    Eosinophils % 0.2 0 - 3 %    Basophils % 0.7 0 - 1 %    Segs Absolute 13.3 K/CU MM    Lymphocytes Absolute 1.1 K/CU MM    Monocytes Absolute 0.4 K/CU MM    Eosinophils Absolute 0.0 K/CU MM    Basophils Absolute 0.1 K/CU MM    Nucleated RBC % 0.8 %    Total Nucleated RBC 0.1 K/CU MM    Total Immature Neutrophil 0.50 K/CU MM    Immature Neutrophil % 3.3 (H) 0 - 0.43 %   Comprehensive Metabolic Panel w/ Reflex to MG    Collection Time: 11/24/22  4:46 AM   Result Value Ref Range    Sodium 127 (L) 135 - 145 MMOL/L    Potassium 5.2 (H) 3.5 - 5.1 MMOL/L    Chloride 95 (L) 99 - 110 mMol/L    CO2 19 (L) 21 - 32 MMOL/L    BUN 45 (H) 6 - 23 MG/DL    Creatinine 1.1 0.9 - 1.3 MG/DL    Est, Glom Filt Rate >60 >60 mL/min/1.73m2    Glucose 117 (H) 70 - 99 MG/DL    Calcium 7.2 (L) 8.3 - 10.6 MG/DL    Albumin 2.0 (L) 3.4 - 5.0 GM/DL    Total Protein 4.9 (L) 6.4 - 8.2 GM/DL    Total Bilirubin 0.4 0.0 - 1.0 MG/DL    ALT 43 (H) 10 - 40 U/L    AST 84 (H) 15 - 37 IU/L    Alkaline Phosphatase 197 (H) 40 - 128 IU/L    Anion Gap 13 4 - 16   Procalcitonin    Collection Time: 11/24/22  4:46 AM   Result Value Ref Range    Procalcitonin >100         Imaging/Diagnostics Last 24 Hours   XR CHEST PORTABLE    Result Date: 11/22/2022  EXAMINATION: ONE XRAY VIEW OF THE CHEST 11/22/2022 9:13 pm COMPARISON: None. HISTORY: ORDERING SYSTEM PROVIDED HISTORY: cough TECHNOLOGIST PROVIDED HISTORY: Reason for exam:->cough Reason for Exam: cough FINDINGS: Frontal portable view of the chest.  Normal lung volume. Multifocal bilateral patchy opacities, most pronounced in the right upper lobe. No definite pleural effusion or pneumothorax. Normal cardiomediastinal silhouette and great vessels. Multilevel degenerative disc disease. Degenerative changes in the bilateral shoulders. Multifocal bilateral patchy opacities are most pronounced in the right upper lobe. Differential considerations include multifocal pneumonia and asymmetric pulmonary edema.  Follow-up PA and lateral chest radiographs 6 weeks after the onset of appropriate medical therapy may be helpful to document improvement and exclude underlying malignant potential.     CTA PULMONARY W CONTRAST    Result Date: 11/23/2022  EXAMINATION: CTA OF THE CHEST 11/23/2022 9:27 am TECHNIQUE: CTA of the chest was performed after the administration of intravenous contrast.  Multiplanar reformatted images are provided for review. MIP images are provided for review. Automated exposure control, iterative reconstruction, and/or weight based adjustment of the mA/kV was utilized to reduce the radiation dose to as low as reasonably achievable. COMPARISON: Chest radiograph 10/22/2022. HISTORY: ORDERING SYSTEM PROVIDED HISTORY: [facility] TECHNOLOGIST PROVIDED HISTORY: Reason for exam:->[facility] Reason for Exam: [facility] Additional signs and symptoms: COUGH, SOB Relevant Medical/Surgical History: 75ML ISOVUE 370/ GFR>60 FINDINGS: Pulmonary Arteries: Streak artifact and motion limits evaluation of the segmental and subsegmental arteries. No central pulmonary embolus is identified. Mediastinum: The heart is within normal limits in size. No pericardial effusion effusion. Mild atherosclerosis of the aorta. There is atherosclerosis of the coronary arteries. No acute aortic abnormality identified. There are enlarged subcarinal and bilateral hilar lymph nodes measuring above 1.0 cm short axis. Patulous esophagus. Lungs/pleura: The trachea and central bronchi appear patent. There are ground-glass opacities within the peripheral and posterior right upper lobe, anterior right middle lobe, and within the dependent portions of the bilateral lower lobes. Dense consolidations within the lung bases favor atelectasis. No pleural effusion or pneumothorax. Motion limits evaluation of the fine anatomic detail. Upper Abdomen: No acute abnormality within the upper abdomen definitively visualized. Surgical clips are seen within the left upper quadrant. The left adrenal gland is not visualized. Diverticulosis of the large bowel. Soft Tissues/Bones: Multifocal degenerative changes of the osseous structures. Diffuse demineralization.   No discrete acute soft tissue abnormality. No evidence of central pulmonary embolus. The peripheral arteries are limited in evaluation. Multifocal ground-glass and dense airspace consolidations, concerning for infection. Follow-up imaging to document resolution is recommended. Lymphadenopathy, nonspecific and possibly reactive.        Electronically signed by George Slaughter MD on 11/24/2022 at 10:22 AM

## 2022-11-25 ENCOUNTER — APPOINTMENT (OUTPATIENT)
Dept: ULTRASOUND IMAGING | Age: 77
DRG: 871 | End: 2022-11-25
Payer: OTHER GOVERNMENT

## 2022-11-25 LAB
ALBUMIN SERPL-MCNC: 2.9 GM/DL (ref 3.4–5)
ALP BLD-CCNC: 322 IU/L (ref 40–129)
ALT SERPL-CCNC: 259 U/L (ref 10–40)
ANION GAP SERPL CALCULATED.3IONS-SCNC: 13 MMOL/L (ref 4–16)
AST SERPL-CCNC: 220 IU/L (ref 15–37)
BANDED NEUTROPHILS ABSOLUTE COUNT: 0.32 K/CU MM
BANDED NEUTROPHILS RELATIVE PERCENT: 2 % (ref 5–11)
BILIRUB SERPL-MCNC: 0.5 MG/DL (ref 0–1)
BUN BLDV-MCNC: 43 MG/DL (ref 6–23)
CALCIUM SERPL-MCNC: 8 MG/DL (ref 8.3–10.6)
CHLORIDE BLD-SCNC: 95 MMOL/L (ref 99–110)
CO2: 23 MMOL/L (ref 21–32)
CREAT SERPL-MCNC: 1.2 MG/DL (ref 0.9–1.3)
DIFFERENTIAL TYPE: ABNORMAL
EOSINOPHILS ABSOLUTE: 0.2 K/CU MM
EOSINOPHILS RELATIVE PERCENT: 1 % (ref 0–3)
GAMMA GLUTAMYL TRANSFERASE: 313 IU/L (ref 8–61)
GFR SERPL CREATININE-BSD FRML MDRD: >60 ML/MIN/1.73M2
GLUCOSE BLD-MCNC: 150 MG/DL (ref 70–99)
HAV IGM SER IA-ACNC: NON REACTIVE
HCT VFR BLD CALC: 45.2 % (ref 42–52)
HEMOGLOBIN: 15.9 GM/DL (ref 13.5–18)
HEPATITIS B CORE IGM ANTIBODY: NON REACTIVE
HEPATITIS B SURFACE ANTIGEN: NON REACTIVE
HEPATITIS C ANTIBODY: NON REACTIVE
HYPERSEGMENTED NEUTROPHILS: PRESENT
LYMPHOCYTES ABSOLUTE: 0.9 K/CU MM
LYMPHOCYTES RELATIVE PERCENT: 6 % (ref 24–44)
MCH RBC QN AUTO: 33.2 PG (ref 27–31)
MCHC RBC AUTO-ENTMCNC: 35.2 % (ref 32–36)
MCV RBC AUTO: 94.4 FL (ref 78–100)
MONOCYTES ABSOLUTE: 0.8 K/CU MM
MONOCYTES RELATIVE PERCENT: 5 % (ref 0–4)
PDW BLD-RTO: 16 % (ref 11.7–14.9)
PLATELET # BLD: 449 K/CU MM (ref 140–440)
PLT MORPHOLOGY: ABNORMAL
PMV BLD AUTO: 10.3 FL (ref 7.5–11.1)
POTASSIUM SERPL-SCNC: 4.4 MMOL/L (ref 3.5–5.1)
RBC # BLD: 4.79 M/CU MM (ref 4.6–6.2)
SEGMENTED NEUTROPHILS ABSOLUTE COUNT: 13.6 K/CU MM
SEGMENTED NEUTROPHILS RELATIVE PERCENT: 86 % (ref 36–66)
SODIUM BLD-SCNC: 131 MMOL/L (ref 135–145)
TOTAL PROTEIN: 5.9 GM/DL (ref 6.4–8.2)
TOXIC GRANULATION: PRESENT
WBC # BLD: 15.8 K/CU MM (ref 4–10.5)

## 2022-11-25 PROCEDURE — 76705 ECHO EXAM OF ABDOMEN: CPT

## 2022-11-25 PROCEDURE — 6360000002 HC RX W HCPCS: Performed by: INTERNAL MEDICINE

## 2022-11-25 PROCEDURE — 85027 COMPLETE CBC AUTOMATED: CPT

## 2022-11-25 PROCEDURE — 2580000003 HC RX 258: Performed by: STUDENT IN AN ORGANIZED HEALTH CARE EDUCATION/TRAINING PROGRAM

## 2022-11-25 PROCEDURE — 94640 AIRWAY INHALATION TREATMENT: CPT

## 2022-11-25 PROCEDURE — 2580000003 HC RX 258: Performed by: INTERNAL MEDICINE

## 2022-11-25 PROCEDURE — 6370000000 HC RX 637 (ALT 250 FOR IP): Performed by: INTERNAL MEDICINE

## 2022-11-25 PROCEDURE — 2060000000 HC ICU INTERMEDIATE R&B

## 2022-11-25 PROCEDURE — 80074 ACUTE HEPATITIS PANEL: CPT

## 2022-11-25 PROCEDURE — 93306 TTE W/DOPPLER COMPLETE: CPT

## 2022-11-25 PROCEDURE — 6360000002 HC RX W HCPCS: Performed by: STUDENT IN AN ORGANIZED HEALTH CARE EDUCATION/TRAINING PROGRAM

## 2022-11-25 PROCEDURE — 82977 ASSAY OF GGT: CPT

## 2022-11-25 PROCEDURE — 94761 N-INVAS EAR/PLS OXIMETRY MLT: CPT

## 2022-11-25 PROCEDURE — 85007 BL SMEAR W/DIFF WBC COUNT: CPT

## 2022-11-25 PROCEDURE — 80053 COMPREHEN METABOLIC PANEL: CPT

## 2022-11-25 PROCEDURE — 36415 COLL VENOUS BLD VENIPUNCTURE: CPT

## 2022-11-25 PROCEDURE — 2700000000 HC OXYGEN THERAPY PER DAY

## 2022-11-25 RX ADMIN — DEXTROSE MONOHYDRATE 500 MG: 50 INJECTION, SOLUTION INTRAVENOUS at 11:06

## 2022-11-25 RX ADMIN — Medication 2 PUFF: at 07:38

## 2022-11-25 RX ADMIN — ALBUTEROL SULFATE 2 PUFF: 90 AEROSOL, METERED RESPIRATORY (INHALATION) at 19:34

## 2022-11-25 RX ADMIN — PREDNISONE 40 MG: 20 TABLET ORAL at 10:52

## 2022-11-25 RX ADMIN — Medication 2 PUFF: at 16:10

## 2022-11-25 RX ADMIN — CEFTRIAXONE SODIUM 2000 MG: 2 INJECTION, POWDER, FOR SOLUTION INTRAMUSCULAR; INTRAVENOUS at 22:08

## 2022-11-25 RX ADMIN — ENOXAPARIN SODIUM 40 MG: 100 INJECTION SUBCUTANEOUS at 10:55

## 2022-11-25 RX ADMIN — ALBUTEROL SULFATE 2 PUFF: 90 AEROSOL, METERED RESPIRATORY (INHALATION) at 07:37

## 2022-11-25 RX ADMIN — Medication 1 TABLET: at 10:53

## 2022-11-25 RX ADMIN — Medication 2000 UNITS: at 10:54

## 2022-11-25 RX ADMIN — Medication 2 PUFF: at 11:41

## 2022-11-25 RX ADMIN — PANTOPRAZOLE SODIUM 40 MG: 40 TABLET, DELAYED RELEASE ORAL at 10:55

## 2022-11-25 RX ADMIN — OMEGA-3-ACID ETHYL ESTERS 2000 MG: 1 CAPSULE, LIQUID FILLED ORAL at 10:52

## 2022-11-25 RX ADMIN — Medication 2 PUFF: at 19:35

## 2022-11-25 RX ADMIN — ALBUTEROL SULFATE 2 PUFF: 90 AEROSOL, METERED RESPIRATORY (INHALATION) at 16:09

## 2022-11-25 RX ADMIN — SODIUM CHLORIDE, PRESERVATIVE FREE 10 ML: 5 INJECTION INTRAVENOUS at 11:21

## 2022-11-25 RX ADMIN — ALBUTEROL SULFATE 2 PUFF: 90 AEROSOL, METERED RESPIRATORY (INHALATION) at 11:40

## 2022-11-25 RX ADMIN — ATORVASTATIN CALCIUM 10 MG: 10 TABLET, FILM COATED ORAL at 10:54

## 2022-11-25 RX ADMIN — ASPIRIN 81 MG: 81 TABLET, COATED ORAL at 10:53

## 2022-11-25 ASSESSMENT — PAIN SCALES - GENERAL
PAINLEVEL_OUTOF10: 0
PAINLEVEL_OUTOF10: 0

## 2022-11-25 NOTE — PROGRESS NOTES
V2.0  [facility] [place] Hospitalist Progress Note      Name:  Wm Kat /Age/Sex: [id number]  (73 y.o. male)   MRN & CSN:  [id number] & [id number] Encounter Date/Time: 2022 1:23 PM EST    Location:  -A PCP: No primary care provider on file. Hospital Day: 4    Assessment and Plan:   Wm Kat is a 65 y.o. male with pmh of hyperlipidemia, CKD stage II, chronic alcohol use, macular degeneration, history of splenectomy who presents with Acute respiratory failure with hypoxia ([facility] [address]    #. Acute respiratory failure with hypoxia probably secondary to pneumonia, COPD exacerbation  - Patient not on home oxygen, on presentation patient was requiring 2 L of oxygen and increased to 6 L, -VBG-pH 7.44, PCO2 22, PO2 53, HCO3 15.3.  - Chest x-ray-multifocal bilateral patchy opacities-most pronounced in the right upper lobe-differential considerations include multifocal pneumonia and asymmetric pulmonary edema. strep pneumonia antigen positive, Legionella negative obtained CT no PE, multifocal groundglass and dense airspace consolidations concerning for infection,respiratory PCR positive for RSV, blood cultures negative after 72 hours, continue ceftriaxone, azithromycin and Solu-Medrol completed, continue prednisone, patient still requiring 4 L of oxygen this morning and      # Severe sepsis secondary to above -improving  - Tachycardia, tachypnea, lactic acidosis, leukocytosis. - As above     # COPD exacerbation  -Patient has wheezing, congested on examination  -Continue steroids, bronchodilator therapy.  -Patient reports he quit smoking 7 years ago.     # Elevated LFTs alk phos 322, ,  bilirubin 0.5,,  will obtain hepatitis panel pending and right upper quadrant ultrasound which showed fatty infiltration of the liver, cholecystectomy, will continue monitor    # Hyponatremia -improving  - Serum osmolality 291,     # There is a concern that patient could be having pulmonary edema and overall picture consistent with CHF. - proBNP 988, Na 126--.127-->131   - Echocardiogram ordered pending     #. HAWA resolving  - Patient has creatinine 1.28- 3/2022 as per Care [facility]  - Creatinine 1.3 - 1.2     # Hyperlipidemia-on fish oil, simvastatin     # Macular degeneration - AREDS     # History of splenectomy     # GERD-on Protonix     # Alcohol use  - Patient admits to drinking 3-4 beers/2-3 times per week. - Monitor for withdrawal.      Diet Diet NPO   DVT Prophylaxis [x] Lovenox, []  Heparin, [] SCDs, [] Ambulation,  [] Eliquis, [] Xarelto  [] Coumadin   Code Status Full Code   Disposition From: Home  Expected Disposition: Home  Estimated Date of Discharge: TBD  Patient requires continued admission due to sepsis   Surrogate Decision Maker/ POA Self     Subjective:     Chief Complaint: Shortness of Breath (X4 days) and Cough       Patient seen and examined at bedside. Still have cough, sputum and shortness of breath         Review of Systems:    Review of Systems    Constitutional: No fever no chills  HEENT: No headache no dizziness  Cardiovascular: No chest pain no palpitations  Respiratory: cough shortness of breath  Abdomen: No diarrhea, no nausea, no vomiting, no abdominal pain  Musculoskeletal: no Swelling  Neuro: No numbness or tingling  Skin: No rash      Objective: Intake/Output Summary (Last 24 hours) at 11/25/2022 1021  Last data filed at 11/25/2022 0445  Gross per 24 hour   Intake 338.85 ml   Output 1650 ml   Net -1311.15 ml          Vitals:   Vitals:    11/25/22 0445   BP:    Pulse: 73   Resp:    Temp:    SpO2:        Physical Exam:     General: Afebrile, mild distress and requiring 4 L  Eyes: EOMI  ENT: neck supple  Cardiovascular: Regular rate. Respiratory: Air entry good bilaterally intermittent wheezing  Gastrointestinal: Soft, non tender  Genitourinary: no suprapubic tenderness  Musculoskeletal: No edema  Skin: warm, dry  Neuro: Alert. Psych: Mood appropriate.      Medications: Medications:    sodium chloride flush  5-40 mL IntraVENous 2 times per day    enoxaparin  40 mg SubCUTAneous Daily    predniSONE  40 mg Oral Daily    azithromycin  500 mg IntraVENous Q24H    aspirin  81 mg Oral Daily    therapeutic multivitamin-minerals  1 tablet Oral Daily    omega-3 acid ethyl esters  2,000 mg Oral Daily    pantoprazole  40 mg Oral Daily    atorvastatin  10 mg Oral Daily    Vitamin D  2,000 Units Oral Daily    cefTRIAXone (ROCEPHIN) IV  2,000 mg IntraVENous Q24H    ipratropium  2 puff Inhalation Q4H While awake    albuterol sulfate HFA  2 puff Inhalation Q4H While awake      Infusions:    sodium chloride 100 mL/hr at 11/24/22 1034     PRN Meds: sodium chloride flush, 5-40 mL, PRN  sodium chloride, , PRN  ondansetron, 4 mg, Q8H PRN   Or  ondansetron, 4 mg, Q6H PRN  polyethylene glycol, 17 g, Daily PRN  acetaminophen, 650 mg, Q6H PRN   Or  acetaminophen, 650 mg, Q6H PRN  ipratropium-albuterol, 1 ampule, Q4H PRN      Labs      Recent Results (from the past 24 hour(s))   CBC with Auto Differential    Collection Time: 11/25/22  7:12 AM   Result Value Ref Range    WBC 15.8 (H) 4.0 - 10.5 K/CU MM    RBC 4.79 4.6 - 6.2 M/CU MM    Hemoglobin 15.9 13.5 - 18.0 GM/DL    Hematocrit 45.2 42 - 52 %    MCV 94.4 78 - 100 FL    MCH 33.2 (H) 27 - 31 PG    MCHC 35.2 32.0 - 36.0 %    RDW 16.0 (H) 11.7 - 14.9 %    Platelets 659 (H) 226 - 440 K/CU MM    MPV 10.3 7.5 - 11.1 FL    Bands Relative 2 (L) 5 - 11 %    Segs Relative 86.0 (H) 36 - 66 %    Eosinophils % 1.0 0 - 3 %    Lymphocytes % 6.0 (L) 24 - 44 %    Monocytes % 5.0 (H) 0 - 4 %    Bands Absolute 0.32 K/CU MM    Segs Absolute 13.6 K/CU MM    Eosinophils Absolute 0.2 K/CU MM    Lymphocytes Absolute 0.9 K/CU MM    Monocytes Absolute 0.8 K/CU MM    Differential Type MANUAL DIFFERENTIAL     Toxic Granulation PRESENT     Hypersegmented Neutrophils PRESENT     PLT Morphology       OCCASIONAL PLATELET CLUMPING AND LARGE PLATELETS NOTED ON SCAN   Comprehensive Metabolic Panel w/ Reflex to MG    Collection Time: 11/25/22  7:12 AM   Result Value Ref Range    Sodium 131 (L) 135 - 145 MMOL/L    Potassium 4.4 3.5 - 5.1 MMOL/L    Chloride 95 (L) 99 - 110 mMol/L    CO2 23 21 - 32 MMOL/L    BUN 43 (H) 6 - 23 MG/DL    Creatinine 1.2 0.9 - 1.3 MG/DL    Est, Glom Filt Rate >60 >60 mL/min/1.73m2    Glucose 150 (H) 70 - 99 MG/DL    Calcium 8.0 (L) 8.3 - 10.6 MG/DL    Albumin 2.9 (L) 3.4 - 5.0 GM/DL    Total Protein 5.9 (L) 6.4 - 8.2 GM/DL    Total Bilirubin 0.5 0.0 - 1.0 MG/DL     (H) 10 - 40 U/L     (H) 15 - 37 IU/L    Alkaline Phosphatase 322 (H) 40 - 129 IU/L    Anion Gap 13 4 - 16   Gamma GT    Collection Time: 11/25/22  7:12 AM   Result Value Ref Range     (H) 8 - 61 IU/L        Imaging/Diagnostics Last 24 Hours   XR CHEST PORTABLE    Result Date: 11/22/2022  EXAMINATION: ONE XRAY VIEW OF THE CHEST 11/22/2022 9:13 pm COMPARISON: None. HISTORY: ORDERING SYSTEM PROVIDED HISTORY: cough TECHNOLOGIST PROVIDED HISTORY: Reason for exam:->cough Reason for Exam: cough FINDINGS: Frontal portable view of the chest.  Normal lung volume. Multifocal bilateral patchy opacities, most pronounced in the right upper lobe. No definite pleural effusion or pneumothorax. Normal cardiomediastinal silhouette and great vessels. Multilevel degenerative disc disease. Degenerative changes in the bilateral shoulders. Multifocal bilateral patchy opacities are most pronounced in the right upper lobe. Differential considerations include multifocal pneumonia and asymmetric pulmonary edema.  Follow-up PA and lateral chest radiographs 6 weeks after the onset of appropriate medical therapy may be helpful to document improvement and exclude underlying malignant potential.     CTA PULMONARY W CONTRAST    Result Date: 11/23/2022  EXAMINATION: CTA OF THE CHEST 11/23/2022 9:27 am TECHNIQUE: CTA of the chest was performed after the administration of intravenous contrast.  Multiplanar reformatted images are provided for review. MIP images are provided for review. Automated exposure control, iterative reconstruction, and/or weight based adjustment of the mA/kV was utilized to reduce the radiation dose to as low as reasonably achievable. COMPARISON: Chest radiograph 10/22/2022. HISTORY: ORDERING SYSTEM PROVIDED HISTORY: [facility] TECHNOLOGIST PROVIDED HISTORY: Reason for exam:->[facility] Reason for Exam: [facility] Additional signs and symptoms: COUGH, SOB Relevant Medical/Surgical History: 75ML ISOVUE 370/ GFR>60 FINDINGS: Pulmonary Arteries: Streak artifact and motion limits evaluation of the segmental and subsegmental arteries. No central pulmonary embolus is identified. Mediastinum: The heart is within normal limits in size. No pericardial effusion effusion. Mild atherosclerosis of the aorta. There is atherosclerosis of the coronary arteries. No acute aortic abnormality identified. There are enlarged subcarinal and bilateral hilar lymph nodes measuring above 1.0 cm short axis. Patulous esophagus. Lungs/pleura: The trachea and central bronchi appear patent. There are ground-glass opacities within the peripheral and posterior right upper lobe, anterior right middle lobe, and within the dependent portions of the bilateral lower lobes. Dense consolidations within the lung bases favor atelectasis. No pleural effusion or pneumothorax. Motion limits evaluation of the fine anatomic detail. Upper Abdomen: No acute abnormality within the upper abdomen definitively visualized. Surgical clips are seen within the left upper quadrant. The left adrenal gland is not visualized. Diverticulosis of the large bowel. Soft Tissues/Bones: Multifocal degenerative changes of the osseous structures. Diffuse demineralization. No discrete acute soft tissue abnormality. No evidence of central pulmonary embolus. The peripheral arteries are limited in evaluation.  Multifocal ground-glass and dense airspace consolidations, concerning for infection. Follow-up imaging to document resolution is recommended. Lymphadenopathy, nonspecific and possibly reactive.        Electronically signed by Jasiel Frye MD on 11/25/2022 at 10:21 AM

## 2022-11-25 NOTE — PLAN OF CARE
Problem: Pain  Goal: Verbalizes/displays adequate comfort level or baseline comfort level  11/25/2022 1220 by Dior Bey RN  Outcome: Progressing  Flowsheets  Taken 11/25/2022 0400 by Kelli Richmond RN  Verbalizes/displays adequate comfort level or baseline comfort level: Encourage patient to monitor pain and request assistance  Taken 11/25/2022 0000 by Kelli Richmond RN  Verbalizes/displays adequate comfort level or baseline comfort level: Encourage patient to monitor pain and request assistance  11/24/2022 2226 by Kelli Richmond RN  Outcome: Progressing  Flowsheets (Taken 11/24/2022 1940)  Verbalizes/displays adequate comfort level or baseline comfort level: Encourage patient to monitor pain and request assistance     Problem: Skin/Tissue Integrity  Goal: Absence of new skin breakdown  Description: 1. Monitor for areas of redness and/or skin breakdown  2. Assess vascular access sites hourly  3. Every 4-6 hours minimum:  Change oxygen saturation probe site  4. Every 4-6 hours:  If on nasal continuous positive airway pressure, respiratory therapy assess nares and determine need for appliance change or resting period.   11/25/2022 1220 by Dior Bey RN  Outcome: Progressing  11/24/2022 2226 by Kelli Richmond RN  Outcome: Progressing

## 2022-11-26 LAB
ALBUMIN SERPL-MCNC: 2.6 GM/DL (ref 3.4–5)
ALP BLD-CCNC: 321 IU/L (ref 40–128)
ALT SERPL-CCNC: 353 U/L (ref 10–40)
ANION GAP SERPL CALCULATED.3IONS-SCNC: 11 MMOL/L (ref 4–16)
AST SERPL-CCNC: 191 IU/L (ref 15–37)
BILIRUB SERPL-MCNC: 0.4 MG/DL (ref 0–1)
BUN BLDV-MCNC: 31 MG/DL (ref 6–23)
CALCIUM SERPL-MCNC: 7.7 MG/DL (ref 8.3–10.6)
CHLORIDE BLD-SCNC: 97 MMOL/L (ref 99–110)
CO2: 23 MMOL/L (ref 21–32)
CREAT SERPL-MCNC: 1 MG/DL (ref 0.9–1.3)
DIFFERENTIAL TYPE: ABNORMAL
GFR SERPL CREATININE-BSD FRML MDRD: >60 ML/MIN/1.73M2
GLUCOSE BLD-MCNC: 140 MG/DL (ref 70–99)
HCT VFR BLD CALC: 41.8 % (ref 42–52)
HEMOGLOBIN: 14.3 GM/DL (ref 13.5–18)
LYMPHOCYTES ABSOLUTE: 1.4 K/CU MM
LYMPHOCYTES RELATIVE PERCENT: 11 % (ref 24–44)
MCH RBC QN AUTO: 32.9 PG (ref 27–31)
MCHC RBC AUTO-ENTMCNC: 34.2 % (ref 32–36)
MCV RBC AUTO: 96.3 FL (ref 78–100)
MONOCYTES ABSOLUTE: 0.9 K/CU MM
MONOCYTES RELATIVE PERCENT: 7 % (ref 0–4)
NUCLEATED RED BLOOD CELLS: 6
PDW BLD-RTO: 16 % (ref 11.7–14.9)
PLATELET # BLD: 430 K/CU MM (ref 140–440)
PMV BLD AUTO: 10.6 FL (ref 7.5–11.1)
POTASSIUM SERPL-SCNC: 4.4 MMOL/L (ref 3.5–5.1)
PROCALCITONIN: 26.94
RBC # BLD: 4.34 M/CU MM (ref 4.6–6.2)
SEGMENTED NEUTROPHILS ABSOLUTE COUNT: 10.3 K/CU MM
SEGMENTED NEUTROPHILS RELATIVE PERCENT: 82 % (ref 36–66)
SODIUM BLD-SCNC: 131 MMOL/L (ref 135–145)
TOTAL PROTEIN: 5.1 GM/DL (ref 6.4–8.2)
WBC # BLD: 12.6 K/CU MM (ref 4–10.5)

## 2022-11-26 PROCEDURE — 1200000000 HC SEMI PRIVATE

## 2022-11-26 PROCEDURE — 2700000000 HC OXYGEN THERAPY PER DAY

## 2022-11-26 PROCEDURE — 85027 COMPLETE CBC AUTOMATED: CPT

## 2022-11-26 PROCEDURE — 6360000002 HC RX W HCPCS: Performed by: INTERNAL MEDICINE

## 2022-11-26 PROCEDURE — 36415 COLL VENOUS BLD VENIPUNCTURE: CPT

## 2022-11-26 PROCEDURE — 94761 N-INVAS EAR/PLS OXIMETRY MLT: CPT

## 2022-11-26 PROCEDURE — 94640 AIRWAY INHALATION TREATMENT: CPT

## 2022-11-26 PROCEDURE — 2580000003 HC RX 258: Performed by: INTERNAL MEDICINE

## 2022-11-26 PROCEDURE — 6370000000 HC RX 637 (ALT 250 FOR IP): Performed by: INTERNAL MEDICINE

## 2022-11-26 PROCEDURE — 85007 BL SMEAR W/DIFF WBC COUNT: CPT

## 2022-11-26 PROCEDURE — 99254 IP/OBS CNSLTJ NEW/EST MOD 60: CPT | Performed by: INTERNAL MEDICINE

## 2022-11-26 PROCEDURE — 80053 COMPREHEN METABOLIC PANEL: CPT

## 2022-11-26 PROCEDURE — 84145 PROCALCITONIN (PCT): CPT

## 2022-11-26 RX ORDER — PREDNISONE 20 MG/1
40 TABLET ORAL DAILY
Status: DISCONTINUED | OUTPATIENT
Start: 2022-11-27 | End: 2022-11-29 | Stop reason: HOSPADM

## 2022-11-26 RX ADMIN — Medication 2 PUFF: at 07:18

## 2022-11-26 RX ADMIN — PREDNISONE 40 MG: 20 TABLET ORAL at 08:38

## 2022-11-26 RX ADMIN — ALBUTEROL SULFATE 2 PUFF: 90 AEROSOL, METERED RESPIRATORY (INHALATION) at 16:12

## 2022-11-26 RX ADMIN — Medication 1 TABLET: at 08:36

## 2022-11-26 RX ADMIN — ALBUTEROL SULFATE 2 PUFF: 90 AEROSOL, METERED RESPIRATORY (INHALATION) at 07:18

## 2022-11-26 RX ADMIN — ALBUTEROL SULFATE 2 PUFF: 90 AEROSOL, METERED RESPIRATORY (INHALATION) at 12:23

## 2022-11-26 RX ADMIN — ENOXAPARIN SODIUM 40 MG: 100 INJECTION SUBCUTANEOUS at 08:37

## 2022-11-26 RX ADMIN — SODIUM CHLORIDE, PRESERVATIVE FREE 10 ML: 5 INJECTION INTRAVENOUS at 20:07

## 2022-11-26 RX ADMIN — ASPIRIN 81 MG: 81 TABLET, COATED ORAL at 08:36

## 2022-11-26 RX ADMIN — OMEGA-3-ACID ETHYL ESTERS 2000 MG: 1 CAPSULE, LIQUID FILLED ORAL at 08:36

## 2022-11-26 RX ADMIN — PANTOPRAZOLE SODIUM 40 MG: 40 TABLET, DELAYED RELEASE ORAL at 05:49

## 2022-11-26 RX ADMIN — ALBUTEROL SULFATE 2 PUFF: 90 AEROSOL, METERED RESPIRATORY (INHALATION) at 18:56

## 2022-11-26 RX ADMIN — Medication 2 PUFF: at 12:23

## 2022-11-26 RX ADMIN — Medication 2 PUFF: at 18:56

## 2022-11-26 RX ADMIN — SODIUM CHLORIDE, PRESERVATIVE FREE 10 ML: 5 INJECTION INTRAVENOUS at 08:38

## 2022-11-26 RX ADMIN — Medication 2 PUFF: at 16:12

## 2022-11-26 NOTE — PROGRESS NOTES
V2.0  [facility] Hospitalist Progress Note      Name:  Madiha Fortune /Age/Sex: [id number]  (73 y.o. male)   MRN & CSN:  [id number] & [id number] Encounter Date/Time: 2022 1:23 PM EST    Location:  -A PCP: No primary care provider on file. Hospital Day: 5    Assessment and Plan:   Madiha Fortune is a 65 y.o. male with pmh of hyperlipidemia, CKD stage II, chronic alcohol use, macular degeneration, history of splenectomy who presents with Acute respiratory failure with hypoxia ([facility] [address]    #. Acute respiratory failure with hypoxia secondary to pneumonia 2/2 RSV, COPD exacerbation  - Patient not on home oxygen, on presentation patient was requiring 2 L of oxygen and increased to 6 L, -VBG-pH 7.44, PCO2 22, PO2 53, HCO3 15.3.  - Chest x-ray-multifocal bilateral patchy opacities-most pronounced in the right upper lobe-differential considerations include multifocal pneumonia and asymmetric pulmonary edema. strep pneumonia antigen positive, respiratory PCR positive for RSV, Legionella negative obtained CT no PE, multifocal groundglass and dense airspace consolidations concerning for infection, blood cultures negative, treated with ceftriaxone, azithromycin and Solu-Medrol, completed azithromycin and Solu-Medrol,  patient still requiring 4 L of oxygen this morning, continue ceftriaxone, prednisone, albuterol inhaler, Atrovent inhaler, duo nebs, incentive spirometry, patient is stable can be transfer to [facility] 5 floor. # Severe sepsis secondary to above -improving  - Tachycardia, tachypnea, lactic acidosis, leukocytosis. - As above     # COPD exacerbation  -Patient reports he quit smoking 7 years ago. Continue as above    # Elevated LFTs alk phos 321, ,  bilirubin 0.5,  obtained hepatitis panel negative and right upper quadrant ultrasound which showed fatty infiltration of the liver, cholecystectomy, held atorvastatin vitamin D, acetaminophen, consulted GI, will continue monitor.     # Hyponatremia -improving  - Serum osmolality 291,   There is a concern that patient could be having pulmonary edema and overall picture consistent with CHF. - proBNP 988, Na 126--.127-->131   - Echocardiogram EF 50 to 55%, left ventricular systolic function normal, sclerotic aortic wall but no stenosis. #.  HAWA resolving  - Patient has creatinine 1.28- 3/2022 as per Care everywhere  - Creatinine 1.3 - 1.2--1.0     # Hyperlipidemia-on fish oil, simvastatin, held atorvastatin but continue omega-3     # Macular degeneration - AREDS     # History of splenectomy     # GERD-on Protonix     # Alcohol use  - Patient admits to drinking 3-4 beers/2-3 times per week. - Monitor for withdrawal.      Diet ADULT DIET; Regular   DVT Prophylaxis [x] Lovenox, []  Heparin, [] SCDs, [] Ambulation,  [] Eliquis, [] Xarelto  [] Coumadin   Code Status Full Code   Disposition From: Home  Expected Disposition: Home  Estimated Date of Discharge: TBD  Patient requires continued admission due to hypoxic respiratory failure and elevated LFTs in consultation   Surrogate Decision Maker/ POA Self     Subjective:     Chief Complaint: Shortness of Breath (X4 days) and Cough       Patient seen and examined at bedside. Patient states his cough improved a lot denies any shortness of breath, abdominal pain, nausea, vomiting, diarrhea. Review of Systems:    Review of Systems    Constitutional: No fever no chills  HEENT: No headache no dizziness  Cardiovascular: No chest pain no palpitations  Respiratory: cough no shortness of breath  Abdomen: No diarrhea, no nausea, no vomiting, no abdominal pain  Musculoskeletal: no Swelling  Neuro: No numbness or tingling  Skin: No rash      Objective:      Intake/Output Summary (Last 24 hours) at 11/26/2022 1020  Last data filed at 11/26/2022 0550  Gross per 24 hour   Intake 600 ml   Output 925 ml   Net -325 ml          Vitals:   Vitals:    11/26/22 0723   BP:    Pulse:    Resp:    Temp:    SpO2: 93% Physical Exam:     General: Afebrile, mild distress and requiring 4 L  Eyes: EOMI  ENT: neck supple  Cardiovascular: S1-S2 normal no murmur  Respiratory: Air entry good bilaterally no wheezing no crackles  Gastrointestinal: Soft, non tender bowel sounds normal  Genitourinary: no suprapubic tenderness  Musculoskeletal: No edema  Skin: warm, dry  Neuro: Alert. Oriented no focal deficit  Psych: Mood appropriate.      Medications:   Medications:    sodium chloride flush  5-40 mL IntraVENous 2 times per day    enoxaparin  40 mg SubCUTAneous Daily    predniSONE  40 mg Oral Daily    aspirin  81 mg Oral Daily    therapeutic multivitamin-minerals  1 tablet Oral Daily    omega-3 acid ethyl esters  2,000 mg Oral Daily    pantoprazole  40 mg Oral Daily    [Held by provider] atorvastatin  10 mg Oral Daily    [Held by provider] Vitamin D  2,000 Units Oral Daily    cefTRIAXone (ROCEPHIN) IV  2,000 mg IntraVENous Q24H    ipratropium  2 puff Inhalation Q4H While awake    albuterol sulfate HFA  2 puff Inhalation Q4H While awake      Infusions:    sodium chloride 100 mL/hr at 11/24/22 1034     PRN Meds: sodium chloride flush, 5-40 mL, PRN  sodium chloride, , PRN  ondansetron, 4 mg, Q8H PRN   Or  ondansetron, 4 mg, Q6H PRN  polyethylene glycol, 17 g, Daily PRN  [Held by provider] acetaminophen, 650 mg, Q6H PRN   Or  [Held by provider] acetaminophen, 650 mg, Q6H PRN  ipratropium-albuterol, 1 ampule, Q4H PRN      Labs      Recent Results (from the past 24 hour(s))   CBC with Auto Differential    Collection Time: 11/26/22  3:40 AM   Result Value Ref Range    WBC 12.6 (H) 4.0 - 10.5 K/CU MM    RBC 4.34 (L) 4.6 - 6.2 M/CU MM    Hemoglobin 14.3 13.5 - 18.0 GM/DL    Hematocrit 41.8 (L) 42 - 52 %    MCV 96.3 78 - 100 FL    MCH 32.9 (H) 27 - 31 PG    MCHC 34.2 32.0 - 36.0 %    RDW 16.0 (H) 11.7 - 14.9 %    Platelets 725 081 - 656 K/CU MM    MPV 10.6 7.5 - 11.1 FL    Segs Relative 82.0 (H) 36 - 66 %    Lymphocytes % 11.0 (L) 24 - 44 % Monocytes % 7.0 (H) 0 - 4 %    nRBC 6     Segs Absolute 10.3 K/CU MM    Lymphocytes Absolute 1.4 K/CU MM    Monocytes Absolute 0.9 K/CU MM    Differential Type MANUAL DIFFERENTIAL    Comprehensive Metabolic Panel w/ Reflex to MG    Collection Time: 11/26/22  3:40 AM   Result Value Ref Range    Sodium 131 (L) 135 - 145 MMOL/L    Potassium 4.4 3.5 - 5.1 MMOL/L    Chloride 97 (L) 99 - 110 mMol/L    CO2 23 21 - 32 MMOL/L    BUN 31 (H) 6 - 23 MG/DL    Creatinine 1.0 0.9 - 1.3 MG/DL    Est, Glom Filt Rate >60 >60 mL/min/1.73m2    Glucose 140 (H) 70 - 99 MG/DL    Calcium 7.7 (L) 8.3 - 10.6 MG/DL    Albumin 2.6 (L) 3.4 - 5.0 GM/DL    Total Protein 5.1 (L) 6.4 - 8.2 GM/DL    Total Bilirubin 0.4 0.0 - 1.0 MG/DL     (H) 10 - 40 U/L     (H) 15 - 37 IU/L    Alkaline Phosphatase 321 (H) 40 - 128 IU/L    Anion Gap 11 4 - 16   Procalcitonin    Collection Time: 11/26/22  3:40 AM   Result Value Ref Range    Procalcitonin 26.94         Imaging/Diagnostics Last 24 Hours   XR CHEST PORTABLE    Result Date: 11/22/2022  EXAMINATION: ONE XRAY VIEW OF THE CHEST 11/22/2022 9:13 pm COMPARISON: None. HISTORY: ORDERING SYSTEM PROVIDED HISTORY: cough TECHNOLOGIST PROVIDED HISTORY: Reason for exam:->cough Reason for Exam: cough FINDINGS: Frontal portable view of the chest.  Normal lung volume. Multifocal bilateral patchy opacities, most pronounced in the right upper lobe. No definite pleural effusion or pneumothorax. Normal cardiomediastinal silhouette and great vessels. Multilevel degenerative disc disease. Degenerative changes in the bilateral shoulders. Multifocal bilateral patchy opacities are most pronounced in the right upper lobe. Differential considerations include multifocal pneumonia and asymmetric pulmonary edema.  Follow-up PA and lateral chest radiographs 6 weeks after the onset of appropriate medical therapy may be helpful to document improvement and exclude underlying malignant potential.     CTA PULMONARY W CONTRAST    Result Date: 11/23/2022  EXAMINATION: CTA OF THE CHEST 11/23/2022 9:27 am TECHNIQUE: CTA of the chest was performed after the administration of intravenous contrast.  Multiplanar reformatted images are provided for review. MIP images are provided for review. Automated exposure control, iterative reconstruction, and/or weight based adjustment of the mA/kV was utilized to reduce the radiation dose to as low as reasonably achievable. COMPARISON: Chest radiograph 10/22/2022. HISTORY: ORDERING SYSTEM PROVIDED HISTORY: [facility] TECHNOLOGIST PROVIDED HISTORY: Reason for exam:->[facility] Reason for Exam: [facility] Additional signs and symptoms: COUGH, SOB Relevant Medical/Surgical History: 75ML ISOVUE 370/ GFR>60 FINDINGS: Pulmonary Arteries: Streak artifact and motion limits evaluation of the segmental and subsegmental arteries. No central pulmonary embolus is identified. Mediastinum: The heart is within normal limits in size. No pericardial effusion effusion. Mild atherosclerosis of the aorta. There is atherosclerosis of the coronary arteries. No acute aortic abnormality identified. There are enlarged subcarinal and bilateral hilar lymph nodes measuring above 1.0 cm short axis. Patulous esophagus. Lungs/pleura: The trachea and central bronchi appear patent. There are ground-glass opacities within the peripheral and posterior right upper lobe, anterior right middle lobe, and within the dependent portions of the bilateral lower lobes. Dense consolidations within the lung bases favor atelectasis. No pleural effusion or pneumothorax. Motion limits evaluation of the fine anatomic detail. Upper Abdomen: No acute abnormality within the upper abdomen definitively visualized. Surgical clips are seen within the left upper quadrant. The left adrenal gland is not visualized. Diverticulosis of the large bowel. Soft Tissues/Bones: Multifocal degenerative changes of the osseous structures. Diffuse demineralization.   No discrete acute soft tissue abnormality. No evidence of central pulmonary embolus. The peripheral arteries are limited in evaluation. Multifocal ground-glass and dense airspace consolidations, concerning for infection. Follow-up imaging to document resolution is recommended. Lymphadenopathy, nonspecific and possibly reactive.        Electronically signed by Pat Herman MD on 11/26/2022 at 10:20 AM

## 2022-11-26 NOTE — PLAN OF CARE
Problem: Pain  Goal: Verbalizes/displays adequate comfort level or baseline comfort level  11/26/2022 0029 by Khloe Yepez RN  Outcome: Progressing  11/25/2022 1220 by Mariya Rivera RN  Outcome: Progressing  Flowsheets  Taken 11/25/2022 0400 by Fracisco King RN  Verbalizes/displays adequate comfort level or baseline comfort level: Encourage patient to monitor pain and request assistance  Taken 11/25/2022 0000 by Fracisco King RN  Verbalizes/displays adequate comfort level or baseline comfort level: Encourage patient to monitor pain and request assistance     Problem: Skin/Tissue Integrity  Goal: Absence of new skin breakdown  Description: 1. Monitor for areas of redness and/or skin breakdown  2. Assess vascular access sites hourly  3. Every 4-6 hours minimum:  Change oxygen saturation probe site  4. Every 4-6 hours:  If on nasal continuous positive airway pressure, respiratory therapy assess nares and determine need for appliance change or resting period.   11/26/2022 0029 by Khloe Yepez RN  Outcome: Progressing  11/25/2022 1220 by Mariya Rivera RN  Outcome: Progressing

## 2022-11-26 NOTE — PLAN OF CARE
Problem: Pain  Goal: Verbalizes/displays adequate comfort level or baseline comfort level  11/26/2022 1256 by Zuhair Crabtree LPN  Outcome: Progressing  11/26/2022 0029 by Fiorella Wren RN  Outcome: Progressing     Problem: Skin/Tissue Integrity  Goal: Absence of new skin breakdown  Description: 1. Monitor for areas of redness and/or skin breakdown  2. Assess vascular access sites hourly  3. Every 4-6 hours minimum:  Change oxygen saturation probe site  4. Every 4-6 hours:  If on nasal continuous positive airway pressure, respiratory therapy assess nares and determine need for appliance change or resting period.   11/26/2022 1256 by Zuhair Crabtree LPN  Outcome: Progressing  11/26/2022 0029 by Fiorella Wren RN  Outcome: Progressing

## 2022-11-26 NOTE — CONSULTS
[address] [facility] Gastroenterology and Hepatology             MD Antonietta Lucero MD Paticitika He, APRN-CNP             [address] [facility] [place], [address] [facility]             [phone] fax [phone]      Gastroenterology Consult Note  Antonietta Lockwood MD      Reason for Consult:  elevated LFTs    Primary Care Physician:  No primary care provider on file. History Obtained From:  patient, electronic medical record    CC: Shortness of breath, elevated LFTs    HISTORY OF PRESENT ILLNESS:              The patient is a 65 y.o.  male with past medical history of hyperlipidemia, macular degeneration, GERD, CKD stage II, history of splenectomy, alcohol use who presented to the hospital with complaint of shortness of breath on 11/22/2022. On presentation the patient was noted to have cough, shortness of breath for 4 days prior to admission associated with chills and complaint of generalized weakness. He denies any nausea, vomiting, abdominal pain, diarrhea, constipation, dysphagia, melena, hematochezia. On presentation noted to have leukocytosis with a WBC count of 28.4, hemoglobin of 14.3, platelet of 545. LFTs were noted to be elevated with alkaline phosphatase of 203, ALT 32, AST 49, total bilirubin 1.2, .6. Since his admission his LFTs have continued to uptrend with most recent today being alkaline phosphatase of 321, , , bilirubin 0.4. GGT was noted to be 313. Chest x-ray with multifocal pneumonia, strep pneumonia antigen positive, Legionella negative, respiratory PCR positive for RSV, blood cultures negative. Right upper quadrant ultrasound with CBD within normal limits, increased echogenicity consistent with fatty infiltration in the liver otherwise unremarkable. He denies any recent medication although he has received azithromycin and ceftriaxone during this hospitalization.   He does admit to drinking 3-4 beers 2-3 times per week    Past Medical History:    History reviewed. No pertinent past medical history. Past Surgical History:        Procedure Laterality Date    CHOLECYSTECTOMY         Medications Prior to Admission:    Prior to Admission medications    Medication Sig Start Date End Date Taking? Authorizing Provider   aspirin 81 MG EC tablet Take 81 mg by mouth daily   Yes Historical Provider, MD   vitamin D (CHOLECALCIFEROL) 25 MCG (1000 UT) TABS tablet Take 2,000 Units by mouth daily   Yes Historical Provider, MD   cyanocobalamin 1000 MCG tablet Take 1,000 mcg by mouth daily   Yes Historical Provider, MD   Omega-3 Fatty Acids (FISH OIL) 1000 MG capsule Take 2,000 mg by mouth daily   Yes Historical Provider, MD   meloxicam (MOBIC) 15 MG tablet Take 15 mg by mouth daily as needed for Pain   Yes Historical Provider, MD   Multiple Vitamins-Minerals (VITEYES AREDS FORMULA/LUTEIN) CAPS Take by mouth   Yes Historical Provider, MD   simvastatin (ZOCOR) 20 MG tablet Take 20 mg by mouth nightly   Yes Historical Provider, MD   pantoprazole (PROTONIX) 20 MG tablet Take 40 mg by mouth daily   Yes Historical Provider, MD       Allergies:  No Known Allergies. Social History:    TOBACCO: Former  ETOH: Active    Family History: reviewed and positives included in HPI- all other pertinent GI family history negative      REVIEW OF SYSTEMS: see HPI for positives and pertinent negatives.  All other systems reviewed and are negative    PHYSICAL EXAM:    Vitals:  /66   Pulse 63   Temp 98 °F (36.7 °C) (Oral)   Resp 17   Ht 6' (1.829 m)   Wt 163 lb 11.2 oz (74.3 kg)   SpO2 95%   BMI 22.20 kg/m²   CONSTITUTIONAL: alert, cooperative, no apparent distress,   EYES:  pupils equal, round and reactive to light and sclera clear  ENT:  normocepalic, without obvious abnormality  NECK:  supple, symmetrical, trachea midline  HEMATOLOGIC/LYMPHATICS:  no cervical lymphadenopathy and no supraclavicular lymphadenopathy  LUNGS:  clear to auscultation  CARDIOVASCULAR: regular rate and rhythm and no murmur noted  ABDOMEN:  Soft, non-tender with normal bowel sounds. No organomegaly or masses  NEUROLOGIC: no focal deficit detected  SKIN:  no lesions  EXTREMITIES: no clubbing, cyanosis, or edema    IMPRESSION:  1) elevated LFTs: Noted to uptrend throughout his hospitalization. Predominantly hepatocellular with elevated AST, ALT and ALP, normal bilirubin. Possible etiologies include drug-induced secondary to use of IV antibiotics. Patient received ceftriaxone, azithromycin versus secondary to sepsis. Right upper quadrant ultrasound with cholecystectomy, no extra or intrahepatic biliary ductal dilation, fatty infiltration of the liver. Acute hepatitis panel negative  2) acute hypoxic respiratory failure secondary to pneumonia. Strep pneumo antigen positive, respiratory PCR positive for RSV. Currently on ceftriaxone, completed azithromycin, on steroid  3) COPD  4) HAWA resolving    RECOMMENDATIONS:  1) continue to trend LFTs. 2) recommend discontinuing ceftriaxone. 3) agree with holding statin  4) can use acetaminophen less than 2 g.  5) check RODY, ASMA, anti-L KM, AMA EBV, CMV, alpha-1 antitrypsin, ceruloplasmin, iron saturation and ferritin      Glenda Hopson MD    Thank you for allowing us to be involved in the management of this patient. We will follow this patient along with you. Please feel free to call with any questions.

## 2022-11-27 LAB
ALBUMIN SERPL-MCNC: 2.7 GM/DL (ref 3.4–5)
ALP BLD-CCNC: 339 IU/L (ref 40–128)
ALT SERPL-CCNC: 421 U/L (ref 10–40)
ANION GAP SERPL CALCULATED.3IONS-SCNC: 9 MMOL/L (ref 4–16)
AST SERPL-CCNC: 180 IU/L (ref 15–37)
BANDED NEUTROPHILS ABSOLUTE COUNT: 0.2 K/CU MM
BANDED NEUTROPHILS RELATIVE PERCENT: 2 % (ref 5–11)
BILIRUB SERPL-MCNC: 0.6 MG/DL (ref 0–1)
BUN BLDV-MCNC: 28 MG/DL (ref 6–23)
CALCIUM SERPL-MCNC: 8 MG/DL (ref 8.3–10.6)
CHLORIDE BLD-SCNC: 100 MMOL/L (ref 99–110)
CO2: 23 MMOL/L (ref 21–32)
CREAT SERPL-MCNC: 0.9 MG/DL (ref 0.9–1.3)
CULTURE: NORMAL
CULTURE: NORMAL
DIFFERENTIAL TYPE: ABNORMAL
FERRITIN: 2706 NG/ML (ref 30–400)
GFR SERPL CREATININE-BSD FRML MDRD: >60 ML/MIN/1.73M2
GLUCOSE BLD-MCNC: 94 MG/DL (ref 70–99)
HCT VFR BLD CALC: 45.4 % (ref 42–52)
HEMOGLOBIN: 15.2 GM/DL (ref 13.5–18)
IRON: 55 UG/DL (ref 59–158)
LYMPHOCYTES ABSOLUTE: 2.1 K/CU MM
LYMPHOCYTES RELATIVE PERCENT: 21 % (ref 24–44)
Lab: NORMAL
Lab: NORMAL
MCH RBC QN AUTO: 32.8 PG (ref 27–31)
MCHC RBC AUTO-ENTMCNC: 33.5 % (ref 32–36)
MCV RBC AUTO: 98.1 FL (ref 78–100)
NUCLEATED RED BLOOD CELLS: 3
PCT TRANSFERRIN: 32 % (ref 10–44)
PDW BLD-RTO: 16.4 % (ref 11.7–14.9)
PLATELET # BLD: 424 K/CU MM (ref 140–440)
PMV BLD AUTO: 10.4 FL (ref 7.5–11.1)
POTASSIUM SERPL-SCNC: 4.7 MMOL/L (ref 3.5–5.1)
PRO-BNP: 2898 PG/ML
RBC # BLD: 4.63 M/CU MM (ref 4.6–6.2)
SEGMENTED NEUTROPHILS ABSOLUTE COUNT: 7.7 K/CU MM
SEGMENTED NEUTROPHILS RELATIVE PERCENT: 77 % (ref 36–66)
SODIUM BLD-SCNC: 132 MMOL/L (ref 135–145)
SPECIMEN: NORMAL
SPECIMEN: NORMAL
TOTAL IRON BINDING CAPACITY: 170 UG/DL (ref 250–450)
TOTAL PROTEIN: 5.6 GM/DL (ref 6.4–8.2)
TOXIC GRANULATION: PRESENT
UNSATURATED IRON BINDING CAPACITY: 115 UG/DL (ref 110–370)
WBC # BLD: 10 K/CU MM (ref 4–10.5)
WBC # BLD: ABNORMAL 10*3/UL

## 2022-11-27 PROCEDURE — 86645 CMV ANTIBODY IGM: CPT

## 2022-11-27 PROCEDURE — 80053 COMPREHEN METABOLIC PANEL: CPT

## 2022-11-27 PROCEDURE — 2700000000 HC OXYGEN THERAPY PER DAY

## 2022-11-27 PROCEDURE — 1200000000 HC SEMI PRIVATE

## 2022-11-27 PROCEDURE — 99232 SBSQ HOSP IP/OBS MODERATE 35: CPT | Performed by: INTERNAL MEDICINE

## 2022-11-27 PROCEDURE — 86663 EPSTEIN-BARR ANTIBODY: CPT

## 2022-11-27 PROCEDURE — 86256 FLUORESCENT ANTIBODY TITER: CPT

## 2022-11-27 PROCEDURE — 83516 IMMUNOASSAY NONANTIBODY: CPT

## 2022-11-27 PROCEDURE — 83550 IRON BINDING TEST: CPT

## 2022-11-27 PROCEDURE — 83880 ASSAY OF NATRIURETIC PEPTIDE: CPT

## 2022-11-27 PROCEDURE — 94640 AIRWAY INHALATION TREATMENT: CPT

## 2022-11-27 PROCEDURE — 6360000002 HC RX W HCPCS: Performed by: INTERNAL MEDICINE

## 2022-11-27 PROCEDURE — 86664 EPSTEIN-BARR NUCLEAR ANTIGEN: CPT

## 2022-11-27 PROCEDURE — 86038 ANTINUCLEAR ANTIBODIES: CPT

## 2022-11-27 PROCEDURE — 85027 COMPLETE CBC AUTOMATED: CPT

## 2022-11-27 PROCEDURE — 6360000002 HC RX W HCPCS: Performed by: STUDENT IN AN ORGANIZED HEALTH CARE EDUCATION/TRAINING PROGRAM

## 2022-11-27 PROCEDURE — 36415 COLL VENOUS BLD VENIPUNCTURE: CPT

## 2022-11-27 PROCEDURE — 86039 ANTINUCLEAR ANTIBODIES (ANA): CPT

## 2022-11-27 PROCEDURE — 82103 ALPHA-1-ANTITRYPSIN TOTAL: CPT

## 2022-11-27 PROCEDURE — 82390 ASSAY OF CERULOPLASMIN: CPT

## 2022-11-27 PROCEDURE — 85007 BL SMEAR W/DIFF WBC COUNT: CPT

## 2022-11-27 PROCEDURE — 83540 ASSAY OF IRON: CPT

## 2022-11-27 PROCEDURE — 6370000000 HC RX 637 (ALT 250 FOR IP): Performed by: STUDENT IN AN ORGANIZED HEALTH CARE EDUCATION/TRAINING PROGRAM

## 2022-11-27 PROCEDURE — 2580000003 HC RX 258: Performed by: INTERNAL MEDICINE

## 2022-11-27 PROCEDURE — 86665 EPSTEIN-BARR CAPSID VCA: CPT

## 2022-11-27 PROCEDURE — 82728 ASSAY OF FERRITIN: CPT

## 2022-11-27 PROCEDURE — 6370000000 HC RX 637 (ALT 250 FOR IP): Performed by: INTERNAL MEDICINE

## 2022-11-27 PROCEDURE — 94761 N-INVAS EAR/PLS OXIMETRY MLT: CPT

## 2022-11-27 RX ORDER — LISINOPRIL 5 MG/1
5 TABLET ORAL DAILY
Status: DISCONTINUED | OUTPATIENT
Start: 2022-11-27 | End: 2022-11-28

## 2022-11-27 RX ORDER — FUROSEMIDE 10 MG/ML
40 INJECTION INTRAMUSCULAR; INTRAVENOUS ONCE
Status: COMPLETED | OUTPATIENT
Start: 2022-11-27 | End: 2022-11-27

## 2022-11-27 RX ORDER — LISINOPRIL 5 MG/1
10 TABLET ORAL DAILY
Status: DISCONTINUED | OUTPATIENT
Start: 2022-11-27 | End: 2022-11-27

## 2022-11-27 RX ADMIN — PANTOPRAZOLE SODIUM 40 MG: 40 TABLET, DELAYED RELEASE ORAL at 05:40

## 2022-11-27 RX ADMIN — ALBUTEROL SULFATE 2 PUFF: 90 AEROSOL, METERED RESPIRATORY (INHALATION) at 11:57

## 2022-11-27 RX ADMIN — Medication 2 PUFF: at 11:58

## 2022-11-27 RX ADMIN — ASPIRIN 81 MG: 81 TABLET, COATED ORAL at 09:09

## 2022-11-27 RX ADMIN — ALBUTEROL SULFATE 2 PUFF: 90 AEROSOL, METERED RESPIRATORY (INHALATION) at 20:40

## 2022-11-27 RX ADMIN — SODIUM CHLORIDE, PRESERVATIVE FREE 10 ML: 5 INJECTION INTRAVENOUS at 09:10

## 2022-11-27 RX ADMIN — SODIUM CHLORIDE, PRESERVATIVE FREE 10 ML: 5 INJECTION INTRAVENOUS at 09:20

## 2022-11-27 RX ADMIN — FUROSEMIDE 40 MG: 10 INJECTION, SOLUTION INTRAMUSCULAR; INTRAVENOUS at 09:20

## 2022-11-27 RX ADMIN — Medication 1 TABLET: at 09:09

## 2022-11-27 RX ADMIN — LISINOPRIL 5 MG: 5 TABLET ORAL at 09:09

## 2022-11-27 RX ADMIN — Medication 2 PUFF: at 15:30

## 2022-11-27 RX ADMIN — PREDNISONE 40 MG: 20 TABLET ORAL at 09:15

## 2022-11-27 RX ADMIN — OMEGA-3-ACID ETHYL ESTERS 2000 MG: 1 CAPSULE, LIQUID FILLED ORAL at 09:08

## 2022-11-27 RX ADMIN — ALBUTEROL SULFATE 2 PUFF: 90 AEROSOL, METERED RESPIRATORY (INHALATION) at 15:30

## 2022-11-27 RX ADMIN — ENOXAPARIN SODIUM 40 MG: 100 INJECTION SUBCUTANEOUS at 09:10

## 2022-11-27 RX ADMIN — SODIUM CHLORIDE, PRESERVATIVE FREE 10 ML: 5 INJECTION INTRAVENOUS at 20:56

## 2022-11-27 RX ADMIN — Medication 2 PUFF: at 20:40

## 2022-11-27 ASSESSMENT — PAIN SCALES - GENERAL: PAINLEVEL_OUTOF10: 0

## 2022-11-27 NOTE — PROGRESS NOTES
V2.0  [facility] [place]      Name:  Diamond Lee /Age/Sex: [id number]  [de-identified]65 y.o. male)   MRN & CSN:  [id number] & [id number] Encounter Date/Time: 2022 1:23 PM EST    Location:  [address] [place] [address]-A PCP: No primary care provider on file. Hospital Day: 6    Assessment and Plan:   Diamond Lee is a 65 y.o. male with pmh of hyperlipidemia, CKD stage II, chronic alcohol use, macular degeneration, history of splenectomy who presents with Acute respiratory failure with hypoxia ([facility] [address]    #. Acute respiratory failure with hypoxia secondary to pneumonia 2/2 RSV, COPD exacerbation  - Patient not on home oxygen, on presentation patient was requiring 2 L of oxygen and increased to 6 L, -VBG-pH 7.44, PCO2 22, PO2 53, HCO3 15.3.  - Chest x-ray-multifocal bilateral patchy opacities-most pronounced in the right upper lobe-differential considerations include multifocal pneumonia and asymmetric pulmonary edema. strep pneumonia antigen positive, respiratory PCR positive for RSV, Legionella negative obtained CT no PE, multifocal groundglass and dense airspace consolidations concerning for infection, blood cultures negative, treated with ceftriaxone, azithromycin and Solu-Medrol, completed azithromycin and Solu-Medrol,  patient still requiring 4 L of oxygen this morning, discontinue ceftriaxone due to elevated LFTs, prednisone, albuterol inhaler, Atrovent inhaler, duo nebs, incentive spirometry. # Severe sepsis secondary to above -improving  - Tachycardia, tachypnea, lactic acidosis, leukocytosis. - As above     # COPD exacerbation  -Patient reports he quit smoking 7 years ago.   Continue as above    # Elevated LFTs alk phos 321-->339, -->421, -->180 bilirubin 0.5,--0.6  obtained hepatitis panel negative and right upper quadrant ultrasound which showed fatty infiltration of the liver, cholecystectomy, held atorvastatin vitamin D, acetaminophen, consulted GI recommended continue to trend LFTs, discontinue ceftriaxone, continue to hold statin and obtained serology, pending result, continue to monitor LFTs    # Hyponatremia -improving  - Serum osmolality 291,   There is a concern that patient could be having pulmonary edema and overall picture consistent with CHF. - proBNP 988, Na 126--.127-->131 -->132  - Echocardiogram EF 50 to 55%, left ventricular systolic function normal, sclerotic aortic wall but no stenosis. # Elevated blood pressure: Patient not in hypertensive but blood pressures 163/77, elevated BNP 2898, will give 1 dose of Lasix and lisinopril 5 mg daily and continue to monitor    #. HAWA resolved  - Patient has creatinine 1.28- 3/2022 as per Care everywhere  - Creatinine 1.3 - 1.2--1.0     # Hyperlipidemia-on fish oil, simvastatin, held atorvastatin but continue omega-3     # Macular degeneration - AREDS     # History of splenectomy     # GERD-on Protonix     # Alcohol use  - Patient admits to drinking 3-4 beers/2-3 times per week. - Monitor for withdrawal.      Diet ADULT DIET; Regular   DVT Prophylaxis [x] Lovenox, []  Heparin, [] SCDs, [] Ambulation,  [] Eliquis, [] Xarelto  [] Coumadin   Code Status Full Code   Disposition From: Home  Expected Disposition: Home  Estimated Date of Discharge: TBD  Patient requires continued admission due to hypoxic respiratory failure and elevated LFTs   Surrogate Decision Maker/ POA Self     Subjective:     Chief Complaint: Shortness of Breath (X4 days) and Cough       Patient seen and examined at bedside. Patient states his cough improved a lot denies any shortness of breath, abdominal pain, nausea, vomiting, diarrhea.       Review of Systems:    Review of Systems    Constitutional: No fever no chills  HEENT: No headache no dizziness  Cardiovascular: No chest pain no palpitations  Respiratory: cough no shortness of breath  Abdomen: No diarrhea, no nausea, no vomiting, no abdominal pain  Musculoskeletal: no Swelling  Neuro: No numbness or tingling  Skin: No rash      Objective: Intake/Output Summary (Last 24 hours) at 11/27/2022 1123  Last data filed at 11/26/2022 2005  Gross per 24 hour   Intake 120 ml   Output --   Net 120 ml          Vitals:   Vitals:    11/27/22 0906   BP:    Pulse:    Resp:    Temp:    SpO2: 95%       Physical Exam:     General: Afebrile, mild distress and requiring 4 L  Eyes: EOMI  ENT: neck supple  Cardiovascular: S1-S2 normal no murmur  Respiratory: Air entry good bilaterally no wheezing no crackles  Gastrointestinal: Soft, non tender bowel sounds normal  Genitourinary: no suprapubic tenderness  Musculoskeletal: No edema  Skin: warm, dry  Neuro: Alert. Oriented no focal deficit  Psych: Mood appropriate.      Medications:   Medications:    lisinopril  5 mg Oral Daily    predniSONE  40 mg Oral Daily    sodium chloride flush  5-40 mL IntraVENous 2 times per day    enoxaparin  40 mg SubCUTAneous Daily    aspirin  81 mg Oral Daily    therapeutic multivitamin-minerals  1 tablet Oral Daily    omega-3 acid ethyl esters  2,000 mg Oral Daily    pantoprazole  40 mg Oral Daily    [Held by provider] atorvastatin  10 mg Oral Daily    [Held by provider] Vitamin D  2,000 Units Oral Daily    ipratropium  2 puff Inhalation Q4H While awake    albuterol sulfate HFA  2 puff Inhalation Q4H While awake      Infusions:    sodium chloride 100 mL/hr at 11/24/22 1034     PRN Meds: sodium chloride flush, 5-40 mL, PRN  sodium chloride, , PRN  ondansetron, 4 mg, Q8H PRN   Or  ondansetron, 4 mg, Q6H PRN  polyethylene glycol, 17 g, Daily PRN  [Held by provider] acetaminophen, 650 mg, Q6H PRN   Or  [Held by provider] acetaminophen, 650 mg, Q6H PRN  ipratropium-albuterol, 1 ampule, Q4H PRN      Labs      Recent Results (from the past 24 hour(s))   Iron and TIBC    Collection Time: 11/27/22  1:33 AM   Result Value Ref Range    Iron 55 (L) 59 - 158 ug/dL    UIBC 115 110 - 370 ug/dL    TIBC 170 (L) 250 - 450 ug/dL    Transferrin % 32 10 - 44 %   CBC with Auto Differential    Collection Time: 11/27/22  6:43 AM   Result Value Ref Range    WBC 10.0 4.0 - 10.5 K/CU MM    RBC 4.63 4.6 - 6.2 M/CU MM    Hemoglobin 15.2 13.5 - 18.0 GM/DL    Hematocrit 45.4 42 - 52 %    MCV 98.1 78 - 100 FL    MCH 32.8 (H) 27 - 31 PG    MCHC 33.5 32.0 - 36.0 %    RDW 16.4 (H) 11.7 - 14.9 %    Platelets 875 840 - 754 K/CU MM    MPV 10.4 7.5 - 11.1 FL    Bands Relative 2 (L) 5 - 11 %    Segs Relative 77.0 (H) 36 - 66 %    Lymphocytes % 21.0 (L) 24 - 44 %    nRBC 3     Bands Absolute 0.20 K/CU MM    Segs Absolute 7.7 K/CU MM    Lymphocytes Absolute 2.1 K/CU MM    Differential Type MANUAL DIFFERENTIAL     Toxic Granulation PRESENT     WBC Morphology ATYPICAL LYMPHS    Comprehensive Metabolic Panel w/ Reflex to MG    Collection Time: 11/27/22  6:43 AM   Result Value Ref Range    Sodium 132 (L) 135 - 145 MMOL/L    Potassium 4.7 3.5 - 5.1 MMOL/L    Chloride 100 99 - 110 mMol/L    CO2 23 21 - 32 MMOL/L    BUN 28 (H) 6 - 23 MG/DL    Creatinine 0.9 0.9 - 1.3 MG/DL    Est, Glom Filt Rate >60 >60 mL/min/1.73m2    Glucose 94 70 - 99 MG/DL    Calcium 8.0 (L) 8.3 - 10.6 MG/DL    Albumin 2.7 (L) 3.4 - 5.0 GM/DL    Total Protein 5.6 (L) 6.4 - 8.2 GM/DL    Total Bilirubin 0.6 0.0 - 1.0 MG/DL     (H) 10 - 40 U/L     (H) 15 - 37 IU/L    Alkaline Phosphatase 339 (H) 40 - 128 IU/L    Anion Gap 9 4 - 16   Brain Natriuretic Peptide    Collection Time: 11/27/22  6:43 AM   Result Value Ref Range    Pro-BNP 2,898 (H) <300 PG/ML        Imaging/Diagnostics Last 24 Hours   XR CHEST PORTABLE    Result Date: 11/22/2022  EXAMINATION: ONE XRAY VIEW OF THE CHEST 11/22/2022 9:13 pm COMPARISON: None. HISTORY: ORDERING SYSTEM PROVIDED HISTORY: cough TECHNOLOGIST PROVIDED HISTORY: Reason for exam:->cough Reason for Exam: cough FINDINGS: Frontal portable view of the chest.  Normal lung volume. Multifocal bilateral patchy opacities, most pronounced in the right upper lobe.   No definite pleural effusion or pneumothorax. Normal cardiomediastinal silhouette and great vessels. Multilevel degenerative disc disease. Degenerative changes in the bilateral shoulders. Multifocal bilateral patchy opacities are most pronounced in the right upper lobe. Differential considerations include multifocal pneumonia and asymmetric pulmonary edema. Follow-up PA and lateral chest radiographs 6 weeks after the onset of appropriate medical therapy may be helpful to document improvement and exclude underlying malignant potential.     CTA PULMONARY W CONTRAST    Result Date: 11/23/2022  EXAMINATION: CTA OF THE CHEST 11/23/2022 9:27 am TECHNIQUE: CTA of the chest was performed after the administration of intravenous contrast.  Multiplanar reformatted images are provided for review. MIP images are provided for review. Automated exposure control, iterative reconstruction, and/or weight based adjustment of the mA/kV was utilized to reduce the radiation dose to as low as reasonably achievable. COMPARISON: Chest radiograph 10/22/2022. HISTORY: ORDERING SYSTEM PROVIDED HISTORY: [facility] TECHNOLOGIST PROVIDED HISTORY: Reason for exam:->[facility] Additional signs and symptoms: COUGH, SOB Relevant Medical/Surgical History: 75ML ISOVUE 370/ GFR>60 FINDINGS: Pulmonary Arteries: Streak artifact and motion limits evaluation of the segmental and subsegmental arteries. No central pulmonary embolus is identified. Mediastinum: The heart is within normal limits in size. No pericardial effusion effusion. Mild atherosclerosis of the aorta. There is atherosclerosis of the coronary arteries. No acute aortic abnormality identified. There are enlarged subcarinal and bilateral hilar lymph nodes measuring above 1.0 cm short axis. Patulous esophagus. Lungs/pleura: The trachea and central bronchi appear patent.   There are ground-glass opacities within the peripheral and posterior right upper lobe, anterior right middle lobe, and within the dependent portions of the bilateral lower lobes. Dense consolidations within the lung bases favor atelectasis. No pleural effusion or pneumothorax. Motion limits evaluation of the fine anatomic detail. Upper Abdomen: No acute abnormality within the upper abdomen definitively visualized. Surgical clips are seen within the left upper quadrant. The left adrenal gland is not visualized. Diverticulosis of the large bowel. Soft Tissues/Bones: Multifocal degenerative changes of the osseous structures. Diffuse demineralization. No discrete acute soft tissue abnormality. No evidence of central pulmonary embolus. The peripheral arteries are limited in evaluation. Multifocal ground-glass and dense airspace consolidations, concerning for infection. Follow-up imaging to document resolution is recommended. Lymphadenopathy, nonspecific and possibly reactive.        Electronically signed by Andrew Finney MD on 11/27/2022 at 11:23 AM

## 2022-11-27 NOTE — PROGRESS NOTES
[address] [facility] Gastroenterology and Hepatology             Gregg Buerger, MD Leane Parry, MD Jenine Shi, APRN-CNP             [address] [facility] [place], [address] [facility]             [phone] fax [phone]      Gastroenterology Progress note . 11/27/2022  Reason for consult:   Elevated LFTs          Interval H/P  Patient noted to be resting comfortably. Mild uptrend in LFTs. Ceftriaxone discontinued. Labs pending. Physical Exam  Blood pressure (!) 146/84, pulse 64, temperature 97.5 °F (36.4 °C), temperature source Oral, resp. rate 16, height 6' (1.829 m), weight 163 lb 3 oz (74 kg), SpO2 95 %. Constitutional: Patient is in no distress. Eyes: Pupils equal, round. No icterus. HENT: Oral mucosa is moist.   Pulmonary: No accessory muscle use. No localizing pulmonary findings. Cardiovascular: Heart has a regular rate and rhythm, no JVD. Gastrointestinal: Bowel sounds are present in all four quadrants. Abdomen is soft, nontender, nondistended. Rectal examination deferred. Skin: No jaundice, spider angiomas, or palmar erythema. No purpura. Neuro: Awake,alert, and oriented x3. No gross focal neurologic deficits. Chart and labs reviewed. IMPRESSION:  1) elevated LFTs: Noted to uptrend throughout his hospitalization. Predominantly hepatocellular with elevated AST, ALT and ALP, normal bilirubin. Possible etiologies include drug-induced secondary to use of IV antibiotics. Patient received ceftriaxone, azithromycin versus secondary to sepsis. Right upper quadrant ultrasound with cholecystectomy, no extra or intrahepatic biliary ductal dilation, fatty infiltration of the liver. Acute hepatitis panel negative  2) acute hypoxic respiratory failure secondary to pneumonia. Strep pneumo antigen positive, respiratory PCR positive for RSV.   Currently on ceftriaxone, completed azithromycin, on steroid  3) COPD  4) HAWA resolving     RECOMMENDATIONS:  1) continue to trend LFTs. 2) recommend discontinuing ceftriaxone. 3) agree with holding statin  4) can use acetaminophen less than 2 g.  5) Await  RODY, ASMA, anti-L KM, AMA EBV, CMV, alpha-1 antitrypsin, ceruloplasmin, iron saturation and ferritin    Patient's history, exam, and plan of care were discussed. Thank you for allowing us to be involved in the management of this patient. We will follow this patient along with you. Please feel free to call with any questions.       Dwight Morrow MD  Gastroenterology   11/27/2022   11:04 AM       Recent Labs     11/25/22  0764 11/26/22  0340 11/27/22  0643   * 191* 180*   * 353* 421*   ALKPHOS 322* 321* 339*   BILITOT 0.5 0.4 0.6

## 2022-11-28 LAB
ALBUMIN SERPL-MCNC: 3.2 GM/DL (ref 3.4–5)
ALP BLD-CCNC: 337 IU/L (ref 40–129)
ALT SERPL-CCNC: 427 U/L (ref 10–40)
ANION GAP SERPL CALCULATED.3IONS-SCNC: 12 MMOL/L (ref 4–16)
AST SERPL-CCNC: 130 IU/L (ref 15–37)
BASOPHILS ABSOLUTE: 0 K/CU MM
BASOPHILS RELATIVE PERCENT: 0.2 % (ref 0–1)
BILIRUB SERPL-MCNC: 0.6 MG/DL (ref 0–1)
BILIRUBIN DIRECT: 0.3 MG/DL (ref 0–0.3)
BILIRUBIN, INDIRECT: 0.3 MG/DL (ref 0–0.7)
BUN BLDV-MCNC: 26 MG/DL (ref 6–23)
CALCIUM SERPL-MCNC: 7.7 MG/DL (ref 8.3–10.6)
CHLORIDE BLD-SCNC: 98 MMOL/L (ref 99–110)
CO2: 24 MMOL/L (ref 21–32)
CREAT SERPL-MCNC: 0.9 MG/DL (ref 0.9–1.3)
DIFFERENTIAL TYPE: ABNORMAL
EOSINOPHILS ABSOLUTE: 0 K/CU MM
EOSINOPHILS RELATIVE PERCENT: 0.1 % (ref 0–3)
GFR SERPL CREATININE-BSD FRML MDRD: >60 ML/MIN/1.73M2
GLUCOSE BLD-MCNC: 114 MG/DL (ref 70–99)
HCT VFR BLD CALC: 44.8 % (ref 42–52)
HEMOGLOBIN: 15.1 GM/DL (ref 13.5–18)
IMMATURE NEUTROPHIL %: 2.9 % (ref 0–0.43)
LYMPHOCYTES ABSOLUTE: 1.5 K/CU MM
LYMPHOCYTES RELATIVE PERCENT: 11.2 % (ref 24–44)
MCH RBC QN AUTO: 32.6 PG (ref 27–31)
MCHC RBC AUTO-ENTMCNC: 33.7 % (ref 32–36)
MCV RBC AUTO: 96.8 FL (ref 78–100)
MONOCYTES ABSOLUTE: 0.5 K/CU MM
MONOCYTES RELATIVE PERCENT: 3.4 % (ref 0–4)
NUCLEATED RBC %: 0.5 %
PDW BLD-RTO: 15.9 % (ref 11.7–14.9)
PLATELET # BLD: 508 K/CU MM (ref 140–440)
PMV BLD AUTO: 10.3 FL (ref 7.5–11.1)
POTASSIUM SERPL-SCNC: 4.6 MMOL/L (ref 3.5–5.1)
PROCALCITONIN: 4.28
RBC # BLD: 4.63 M/CU MM (ref 4.6–6.2)
SEGMENTED NEUTROPHILS ABSOLUTE COUNT: 11 K/CU MM
SEGMENTED NEUTROPHILS RELATIVE PERCENT: 82.2 % (ref 36–66)
SODIUM BLD-SCNC: 134 MMOL/L (ref 135–145)
TOTAL IMMATURE NEUTOROPHIL: 0.39 K/CU MM
TOTAL NUCLEATED RBC: 0.1 K/CU MM
TOTAL PROTEIN: 5.8 GM/DL (ref 6.4–8.2)
WBC # BLD: 13.4 K/CU MM (ref 4–10.5)

## 2022-11-28 PROCEDURE — 1200000000 HC SEMI PRIVATE

## 2022-11-28 PROCEDURE — 36415 COLL VENOUS BLD VENIPUNCTURE: CPT

## 2022-11-28 PROCEDURE — 85025 COMPLETE CBC W/AUTO DIFF WBC: CPT

## 2022-11-28 PROCEDURE — 2700000000 HC OXYGEN THERAPY PER DAY

## 2022-11-28 PROCEDURE — 6370000000 HC RX 637 (ALT 250 FOR IP): Performed by: STUDENT IN AN ORGANIZED HEALTH CARE EDUCATION/TRAINING PROGRAM

## 2022-11-28 PROCEDURE — 94761 N-INVAS EAR/PLS OXIMETRY MLT: CPT

## 2022-11-28 PROCEDURE — 6360000002 HC RX W HCPCS: Performed by: INTERNAL MEDICINE

## 2022-11-28 PROCEDURE — 2580000003 HC RX 258: Performed by: INTERNAL MEDICINE

## 2022-11-28 PROCEDURE — 80053 COMPREHEN METABOLIC PANEL: CPT

## 2022-11-28 PROCEDURE — 94640 AIRWAY INHALATION TREATMENT: CPT

## 2022-11-28 PROCEDURE — 6370000000 HC RX 637 (ALT 250 FOR IP): Performed by: INTERNAL MEDICINE

## 2022-11-28 PROCEDURE — 99232 SBSQ HOSP IP/OBS MODERATE 35: CPT | Performed by: INTERNAL MEDICINE

## 2022-11-28 PROCEDURE — 82248 BILIRUBIN DIRECT: CPT

## 2022-11-28 PROCEDURE — 84145 PROCALCITONIN (PCT): CPT

## 2022-11-28 RX ORDER — LISINOPRIL 5 MG/1
10 TABLET ORAL DAILY
Status: DISCONTINUED | OUTPATIENT
Start: 2022-11-28 | End: 2022-11-29 | Stop reason: HOSPADM

## 2022-11-28 RX ADMIN — SODIUM CHLORIDE, PRESERVATIVE FREE 10 ML: 5 INJECTION INTRAVENOUS at 21:48

## 2022-11-28 RX ADMIN — PANTOPRAZOLE SODIUM 40 MG: 40 TABLET, DELAYED RELEASE ORAL at 05:28

## 2022-11-28 RX ADMIN — SODIUM CHLORIDE, PRESERVATIVE FREE 10 ML: 5 INJECTION INTRAVENOUS at 09:27

## 2022-11-28 RX ADMIN — Medication 2 PUFF: at 07:55

## 2022-11-28 RX ADMIN — ALBUTEROL SULFATE 2 PUFF: 90 AEROSOL, METERED RESPIRATORY (INHALATION) at 21:25

## 2022-11-28 RX ADMIN — OMEGA-3-ACID ETHYL ESTERS 2000 MG: 1 CAPSULE, LIQUID FILLED ORAL at 09:26

## 2022-11-28 RX ADMIN — Medication 2 PUFF: at 15:35

## 2022-11-28 RX ADMIN — LISINOPRIL 10 MG: 5 TABLET ORAL at 09:27

## 2022-11-28 RX ADMIN — Medication 1 TABLET: at 09:26

## 2022-11-28 RX ADMIN — ALBUTEROL SULFATE 2 PUFF: 90 AEROSOL, METERED RESPIRATORY (INHALATION) at 15:36

## 2022-11-28 RX ADMIN — ASPIRIN 81 MG: 81 TABLET, COATED ORAL at 09:27

## 2022-11-28 RX ADMIN — ENOXAPARIN SODIUM 40 MG: 100 INJECTION SUBCUTANEOUS at 09:27

## 2022-11-28 RX ADMIN — Medication 2 PUFF: at 21:27

## 2022-11-28 RX ADMIN — ALBUTEROL SULFATE 2 PUFF: 90 AEROSOL, METERED RESPIRATORY (INHALATION) at 11:54

## 2022-11-28 RX ADMIN — Medication 2 PUFF: at 11:55

## 2022-11-28 RX ADMIN — ALBUTEROL SULFATE 2 PUFF: 90 AEROSOL, METERED RESPIRATORY (INHALATION) at 07:55

## 2022-11-28 RX ADMIN — PREDNISONE 40 MG: 20 TABLET ORAL at 09:26

## 2022-11-28 NOTE — CARE COORDINATION
CM into see pt to initiate a safe discharge plan. Cm  introduced self and explained role of CM. Pt is kind, alert and oriented. Pt lives with his wife who is in room. Permission to speak with her present. Pt goes to the [address] Pt and wife are able to drive. Pt uses no DME. He is independent at home. Pt has insurance and the [place] supplies medications. Pt offered home care and he declined. Discharge plan is home with and no needs/ VA/ insurance. Able to drive. Declined home care.   CM provided card and encouraged to call for any needs or concern. CM is available if any needs arise.

## 2022-11-28 NOTE — PLAN OF CARE
Problem: Pain  Goal: Verbalizes/displays adequate comfort level or baseline comfort level  Outcome: Progressing  Flowsheets (Taken 11/28/2022 0915)  Verbalizes/displays adequate comfort level or baseline comfort level:   Encourage patient to monitor pain and request assistance   Assess pain using appropriate pain scale   Administer analgesics based on type and severity of pain and evaluate response   Implement non-pharmacological measures as appropriate and evaluate response   Consider cultural and social influences on pain and pain management   Notify Licensed Independent Practitioner if interventions unsuccessful or patient reports new pain     Problem: Skin/Tissue Integrity  Goal: Absence of new skin breakdown  Description: 1. Monitor for areas of redness and/or skin breakdown  2. Assess vascular access sites hourly  3. Every 4-6 hours minimum:  Change oxygen saturation probe site  4. Every 4-6 hours:  If on nasal continuous positive airway pressure, respiratory therapy assess nares and determine need for appliance change or resting period.   Outcome: Progressing

## 2022-11-28 NOTE — PROGRESS NOTES
[address] [facility] Gastroenterology and Hepatology             MD Shelby Multani MD Sonja Brazier, APRN-CNP             [address] [facility], [address] [facility]             [phone] fax [phone]      Gastroenterology Progress note . 11/28/2022  Reason for consult:   Elevated LFTs          Interval H/P  Patient noted to be resting comfortably. LFTs added to blood collected in the a.m.elevated Ferritin however transferrin sat 32%  Denies any abdominal pain, nausea or vomiting. Physical Exam  Blood pressure (!) 162/85, pulse 77, temperature 97.9 °F (36.6 °C), temperature source Oral, resp. rate 18, height 6' (1.829 m), weight 163 lb 3 oz (74 kg), SpO2 96 %. Constitutional: Patient is in no distress. Eyes: Pupils equal, round. No icterus. HENT: Oral mucosa is moist.   Pulmonary: No accessory muscle use. No localizing pulmonary findings. Cardiovascular: Heart has a regular rate and rhythm, no JVD. Gastrointestinal: Bowel sounds are present in all four quadrants. Abdomen is soft, nontender, nondistended. Rectal examination deferred. Skin: No jaundice, spider angiomas, or palmar erythema. No purpura. Neuro: Awake,alert, and oriented x3. No gross focal neurologic deficits. Chart and labs reviewed. IMPRESSION:  1) elevated LFTs: Noted to uptrend throughout his hospitalization. Predominantly hepatocellular with elevated AST, ALT and ALP, normal bilirubin. Possible etiologies include drug-induced secondary to use of IV antibiotics. Patient received ceftriaxone, azithromycin versus secondary to sepsis. Right upper quadrant ultrasound with cholecystectomy, no extra or intrahepatic biliary ductal dilation, fatty infiltration of the liver. Acute hepatitis panel negative  2) acute hypoxic respiratory failure secondary to pneumonia. Strep pneumo antigen positive, respiratory PCR positive for RSV.   Currently on ceftriaxone, completed azithromycin, on steroid  3) COPD  4) HAWA resolving     RECOMMENDATIONS:  1) continue to trend LFTs. 2) recommend discontinuing ceftriaxone. 3) agree with holding statin  4) can use acetaminophen less than 2 g.  5) Await  RODY, ASMA, anti-L KM, AMA EBV, CMV, alpha-1 antitrypsin, ceruloplasmin. Note made of elevated Ferritin however transferrin sat 32%    Patient's history, exam, and plan of care were discussed. Thank you for allowing us to be involved in the management of this patient. We will follow this patient along with you. Please feel free to call with any questions.       Dwight Morrow MD  Gastroenterology   11/28/2022   7:51 AM       Recent Labs     11/26/22  0340 11/27/22  0643   * 180*   * 421*   ALKPHOS 321* 339*   BILITOT 0.4 0.6

## 2022-11-28 NOTE — PROGRESS NOTES
V2.0  [facility] Hospitalist Progress Note      Name:  Petar Winn /Age/Sex: [id number]  [de-identified]65 y.o. male)   MRN & CSN:  [id number] & [id number] Encounter Date/Time: 2022 1:23 PM EST    [facility]:  [facility] PCP: No primary care provider on file. Hospital Day: 7    Assessment and Plan:   Petar Winn is a 65 y.o. male with pmh of hyperlipidemia, CKD stage II, chronic alcohol use, macular degeneration, history of splenectomy who presents with Acute respiratory failure with hypoxia ([facility] [address]    #. Acute respiratory failure with hypoxia secondary to pneumonia 2/2 RSV, COPD exacerbation  - Patient not on home oxygen, on presentation patient was requiring 2 L of oxygen and increased to 6 L, -VBG-pH 7.44, PCO2 22, PO2 53, HCO3 15.3.  - Chest x-ray-multifocal bilateral patchy opacities-most pronounced in the right upper lobe-differential considerations include multifocal pneumonia and asymmetric pulmonary edema. strep pneumonia antigen positive, respiratory PCR positive for RSV, Legionella negative obtained CT no PE, multifocal groundglass and dense airspace consolidations concerning for infection, blood cultures negative, treated with ceftriaxone, azithromycin and Solu-Medrol, completed azithromycin and Solu-Medrol,  patient still requiring 2 L of oxygen this morning, discontinued ceftriaxone due to elevated LFTs, prednisone, albuterol inhaler, Atrovent inhaler, duo nebs, incentive spirometry. # Severe sepsis secondary to above -improving  - Tachycardia, tachypnea, lactic acidosis, leukocytosis. - As above     # COPD exacerbation  -Patient reports he quit smoking 7 years ago.   Continue as above    # Elevated LFTs alk phos 321-->339, -->421, -->180 bilirubin 0.5,--0.6  obtained hepatitis panel negative and right upper quadrant ultrasound which showed fatty infiltration of the liver, cholecystectomy, held atorvastatin vitamin D, acetaminophen, consulted GI recommended continue to trend LFTs, discontinued ceftriaxone, continue to hold statin and obtained serology, pending result, continue to monitor LFTs    # Hyponatremia -improving  - Serum osmolality 291,   There is a concern that patient could be having pulmonary edema and overall picture consistent with CHF. - proBNP 988, Na 126--.127-->131 -->132  - Echocardiogram EF 50 to 55%, left ventricular systolic function normal, sclerotic aortic wall but no stenosis. # Elevated blood pressure: Patient not in hypertensive but blood pressures 163/77, elevated BNP 2898, will give 1 dose of Lasix and lisinopril 5 mg daily and continue to monitor    #. HAWA resolved  - Patient has creatinine 1.28- 3/2022 as per Care everywhere  - Creatinine 1.3 - 1.2--1.0     # Hyperlipidemia-on fish oil, simvastatin, held atorvastatin but continue omega-3     # Macular degeneration - AREDS     # History of splenectomy     # GERD-on Protonix     # Alcohol use  - Patient admits to drinking 3-4 beers/2-3 times per week. - Monitor for withdrawal.      Diet ADULT DIET; Regular   DVT Prophylaxis [x] Lovenox, []  Heparin, [] SCDs, [] Ambulation,  [] Eliquis, [] Xarelto  [] Coumadin   Code Status Full Code   Disposition From: Home  Expected Disposition: Home  Estimated Date of Discharge: TBD  Patient requires continued admission due to hypoxic respiratory failure and elevated LFTs   Surrogate Decision Maker/ POA Self     Subjective:     Chief Complaint: Shortness of Breath (X4 days) and Cough       Patient seen and examined at bedside. Patient states his cough improved a lot denies any shortness of breath, abdominal pain, nausea, vomiting, diarrhea.       Review of Systems:    Review of Systems    Constitutional: No fever no chills  HEENT: No headache no dizziness  Cardiovascular: No chest pain no palpitations  Respiratory: cough no shortness of breath  Abdomen: No diarrhea, no nausea, no vomiting, no abdominal pain  Musculoskeletal: no Swelling  Neuro: No numbness or tingling  Skin: No rash      Objective: Intake/Output Summary (Last 24 hours) at 11/28/2022 1349  Last data filed at 11/27/2022 2045  Gross per 24 hour   Intake 120 ml   Output --   Net 120 ml          Vitals:   Vitals:    11/28/22 1155   BP:    Pulse:    Resp:    Temp:    SpO2: 95%       Physical Exam:     General: Afebrile, mild distress and requiring 4 L  Eyes: EOMI  ENT: neck supple  Cardiovascular: S1-S2 normal no murmur  Respiratory: Air entry good bilaterally no wheezing no crackles  Gastrointestinal: Soft, non tender bowel sounds normal  Genitourinary: no suprapubic tenderness  Musculoskeletal: No edema  Skin: warm, dry  Neuro: Alert. Oriented no focal deficit  Psych: Mood appropriate.      Medications:   Medications:    lisinopril  10 mg Oral Daily    predniSONE  40 mg Oral Daily    sodium chloride flush  5-40 mL IntraVENous 2 times per day    enoxaparin  40 mg SubCUTAneous Daily    aspirin  81 mg Oral Daily    therapeutic multivitamin-minerals  1 tablet Oral Daily    omega-3 acid ethyl esters  2,000 mg Oral Daily    pantoprazole  40 mg Oral Daily    [Held by provider] atorvastatin  10 mg Oral Daily    [Held by provider] Vitamin D  2,000 Units Oral Daily    ipratropium  2 puff Inhalation Q4H While awake    albuterol sulfate HFA  2 puff Inhalation Q4H While awake      Infusions:    sodium chloride 100 mL/hr at 11/24/22 1034     PRN Meds: sodium chloride flush, 5-40 mL, PRN  sodium chloride, , PRN  ondansetron, 4 mg, Q8H PRN   Or  ondansetron, 4 mg, Q6H PRN  polyethylene glycol, 17 g, Daily PRN  [Held by provider] acetaminophen, 650 mg, Q6H PRN   Or  [Held by provider] acetaminophen, 650 mg, Q6H PRN  ipratropium-albuterol, 1 ampule, Q4H PRN      Labs      Recent Results (from the past 24 hour(s))   Procalcitonin    Collection Time: 11/28/22  5:28 AM   Result Value Ref Range    Procalcitonin 4.28    CBC with Auto Differential    Collection Time: 11/28/22  5:28 AM   Result Value Ref Range    WBC 13.4 (H) 4.0 - 10.5 K/CU MM    RBC 4.63 4.6 - 6.2 M/CU MM    Hemoglobin 15.1 13.5 - 18.0 GM/DL    Hematocrit 44.8 42 - 52 %    MCV 96.8 78 - 100 FL    MCH 32.6 (H) 27 - 31 PG    MCHC 33.7 32.0 - 36.0 %    RDW 15.9 (H) 11.7 - 14.9 %    Platelets 916 (H) 827 - 440 K/CU MM    MPV 10.3 7.5 - 11.1 FL    Differential Type AUTOMATED DIFFERENTIAL     Segs Relative 82.2 (H) 36 - 66 %    Lymphocytes % 11.2 (L) 24 - 44 %    Monocytes % 3.4 0 - 4 %    Eosinophils % 0.1 0 - 3 %    Basophils % 0.2 0 - 1 %    Segs Absolute 11.0 K/CU MM    Lymphocytes Absolute 1.5 K/CU MM    Monocytes Absolute 0.5 K/CU MM    Eosinophils Absolute 0.0 K/CU MM    Basophils Absolute 0.0 K/CU MM    Nucleated RBC % 0.5 %    Total Nucleated RBC 0.1 K/CU MM    Total Immature Neutrophil 0.39 K/CU MM    Immature Neutrophil % 2.9 (H) 0 - 0.43 %   Basic Metabolic Panel w/ Reflex to MG    Collection Time: 11/28/22  5:28 AM   Result Value Ref Range    Sodium 134 (L) 135 - 145 MMOL/L    Potassium 4.6 3.5 - 5.1 MMOL/L    Chloride 98 (L) 99 - 110 mMol/L    CO2 24 21 - 32 MMOL/L    Anion Gap 12 4 - 16    BUN 26 (H) 6 - 23 MG/DL    Creatinine 0.9 0.9 - 1.3 MG/DL    Est, Glom Filt Rate >60 >60 mL/min/1.73m2    Glucose 114 (H) 70 - 99 MG/DL    Calcium 7.7 (L) 8.3 - 10.6 MG/DL   Hepatic Function Panel    Collection Time: 11/28/22  5:28 AM   Result Value Ref Range    Albumin 3.2 (L) 3.4 - 5.0 GM/DL    Total Bilirubin 0.6 0.0 - 1.0 MG/DL    Bilirubin, Direct 0.3 0.0 - 0.3 MG/DL    Bilirubin, Indirect 0.3 0 - 0.7 MG/DL    Alkaline Phosphatase 337 (H) 40 - 129 IU/L     (H) 15 - 37 IU/L     (H) 10 - 40 U/L    Total Protein 5.8 (L) 6.4 - 8.2 GM/DL        Imaging/Diagnostics Last 24 Hours   XR CHEST PORTABLE    Result Date: 11/22/2022  EXAMINATION: ONE XRAY VIEW OF THE CHEST 11/22/2022 9:13 pm COMPARISON: None.  HISTORY: ORDERING SYSTEM PROVIDED HISTORY: cough TECHNOLOGIST PROVIDED HISTORY: Reason for exam:->cough Reason for Exam: cough FINDINGS: Frontal portable view of the chest.  Normal lung volume. Multifocal bilateral patchy opacities, most pronounced in the right upper lobe. No definite pleural effusion or pneumothorax. Normal cardiomediastinal silhouette and great vessels. Multilevel degenerative disc disease. Degenerative changes in the bilateral shoulders. Multifocal bilateral patchy opacities are most pronounced in the right upper lobe. Differential considerations include multifocal pneumonia and asymmetric pulmonary edema. Follow-up PA and lateral chest radiographs 6 weeks after the onset of appropriate medical therapy may be helpful to document improvement and exclude underlying malignant potential.     CTA PULMONARY W CONTRAST    Result Date: 11/23/2022  EXAMINATION: CTA OF THE CHEST 11/23/2022 9:27 am TECHNIQUE: CTA of the chest was performed after the administration of intravenous contrast.  Multiplanar reformatted images are provided for review. MIP images are provided for review. Automated exposure control, iterative reconstruction, and/or weight based adjustment of the mA/kV was utilized to reduce the radiation dose to as low as reasonably achievable. COMPARISON: Chest radiograph 10/22/2022. HISTORY: ORDERING SYSTEM PROVIDED HISTORY: [facility] TECHNOLOGIST PROVIDED HISTORY: Reason for exam:->[facility] Reason for Exam: [facility] Additional signs and symptoms: COUGH, SOB Relevant Medical/Surgical History: 75ML ISOVUE 370/ GFR>60 FINDINGS: Pulmonary Arteries: Streak artifact and motion limits evaluation of the segmental and subsegmental arteries. No central pulmonary embolus is identified. Mediastinum: The heart is within normal limits in size. No pericardial effusion effusion. Mild atherosclerosis of the aorta. There is atherosclerosis of the coronary arteries. No acute aortic abnormality identified. There are enlarged subcarinal and bilateral hilar lymph nodes measuring above 1.0 cm short axis. Patulous esophagus. Lungs/pleura:  The trachea and central bronchi appear patent. There are ground-glass opacities within the peripheral and posterior right upper lobe, anterior right middle lobe, and within the dependent portions of the bilateral lower lobes. Dense consolidations within the lung bases favor atelectasis. No pleural effusion or pneumothorax. Motion limits evaluation of the fine anatomic detail. Upper Abdomen: No acute abnormality within the upper abdomen definitively visualized. Surgical clips are seen within the left upper quadrant. The left adrenal gland is not visualized. Diverticulosis of the large bowel. Soft Tissues/Bones: Multifocal degenerative changes of the osseous structures. Diffuse demineralization. No discrete acute soft tissue abnormality. No evidence of central pulmonary embolus. The peripheral arteries are limited in evaluation. Multifocal ground-glass and dense airspace consolidations, concerning for infection. Follow-up imaging to document resolution is recommended. Lymphadenopathy, nonspecific and possibly reactive.        Electronically signed by Carlitos Reaves MD on 11/28/2022 at 1:49 PM

## 2022-11-29 VITALS
OXYGEN SATURATION: 99 % | BODY MASS INDEX: 22.1 KG/M2 | HEART RATE: 87 BPM | HEIGHT: 72 IN | SYSTOLIC BLOOD PRESSURE: 183 MMHG | DIASTOLIC BLOOD PRESSURE: 96 MMHG | TEMPERATURE: 98.5 F | WEIGHT: 163.19 LBS | RESPIRATION RATE: 18 BRPM

## 2022-11-29 LAB
ALBUMIN SERPL-MCNC: 3 GM/DL (ref 3.4–5)
ALP BLD-CCNC: 320 IU/L (ref 40–128)
ALPHA-1 ANTITRYPSIN: 235 MG/DL (ref 90–200)
ALT SERPL-CCNC: 437 U/L (ref 10–40)
ANION GAP SERPL CALCULATED.3IONS-SCNC: 11 MMOL/L (ref 4–16)
AST SERPL-CCNC: 137 IU/L (ref 15–37)
BASOPHILS ABSOLUTE: 0 K/CU MM
BASOPHILS RELATIVE PERCENT: 0.1 % (ref 0–1)
BILIRUB SERPL-MCNC: 0.7 MG/DL (ref 0–1)
BUN BLDV-MCNC: 23 MG/DL (ref 6–23)
CALCIUM SERPL-MCNC: 8.1 MG/DL (ref 8.3–10.6)
CERULOPLASMIN: 12 MG/DL (ref 15–30)
CHLORIDE BLD-SCNC: 98 MMOL/L (ref 99–110)
CO2: 22 MMOL/L (ref 21–32)
CREAT SERPL-MCNC: 0.8 MG/DL (ref 0.9–1.3)
DIFFERENTIAL TYPE: ABNORMAL
EOSINOPHILS ABSOLUTE: 0 K/CU MM
EOSINOPHILS RELATIVE PERCENT: 0.2 % (ref 0–3)
GFR SERPL CREATININE-BSD FRML MDRD: >60 ML/MIN/1.73M2
GLUCOSE BLD-MCNC: 149 MG/DL (ref 70–99)
HCT VFR BLD CALC: 45.4 % (ref 42–52)
HEMOGLOBIN: 15.4 GM/DL (ref 13.5–18)
IMMATURE NEUTROPHIL %: 2.3 % (ref 0–0.43)
LYMPHOCYTES ABSOLUTE: 1.5 K/CU MM
LYMPHOCYTES RELATIVE PERCENT: 10.9 % (ref 24–44)
MAGNESIUM: 2.4 MG/DL (ref 1.8–2.4)
MCH RBC QN AUTO: 33 PG (ref 27–31)
MCHC RBC AUTO-ENTMCNC: 33.9 % (ref 32–36)
MCV RBC AUTO: 97.2 FL (ref 78–100)
MONOCYTES ABSOLUTE: 0.5 K/CU MM
MONOCYTES RELATIVE PERCENT: 3.5 % (ref 0–4)
NUCLEATED RBC %: 0.3 %
PDW BLD-RTO: 16.1 % (ref 11.7–14.9)
PLATELET # BLD: 536 K/CU MM (ref 140–440)
PMV BLD AUTO: 10.8 FL (ref 7.5–11.1)
POTASSIUM SERPL-SCNC: 4.7 MMOL/L (ref 3.5–5.1)
PROCALCITONIN: 1.96
RBC # BLD: 4.67 M/CU MM (ref 4.6–6.2)
SEGMENTED NEUTROPHILS ABSOLUTE COUNT: 11.2 K/CU MM
SEGMENTED NEUTROPHILS RELATIVE PERCENT: 83 % (ref 36–66)
SODIUM BLD-SCNC: 131 MMOL/L (ref 135–145)
TOTAL IMMATURE NEUTOROPHIL: 0.31 K/CU MM
TOTAL NUCLEATED RBC: 0 K/CU MM
TOTAL PROTEIN: 5.5 GM/DL (ref 6.4–8.2)
WBC # BLD: 13.5 K/CU MM (ref 4–10.5)

## 2022-11-29 PROCEDURE — 6360000002 HC RX W HCPCS: Performed by: INTERNAL MEDICINE

## 2022-11-29 PROCEDURE — 6370000000 HC RX 637 (ALT 250 FOR IP): Performed by: STUDENT IN AN ORGANIZED HEALTH CARE EDUCATION/TRAINING PROGRAM

## 2022-11-29 PROCEDURE — 83735 ASSAY OF MAGNESIUM: CPT

## 2022-11-29 PROCEDURE — 94664 DEMO&/EVAL PT USE INHALER: CPT

## 2022-11-29 PROCEDURE — 6370000000 HC RX 637 (ALT 250 FOR IP): Performed by: INTERNAL MEDICINE

## 2022-11-29 PROCEDURE — 94640 AIRWAY INHALATION TREATMENT: CPT

## 2022-11-29 PROCEDURE — 99232 SBSQ HOSP IP/OBS MODERATE 35: CPT | Performed by: INTERNAL MEDICINE

## 2022-11-29 PROCEDURE — 80053 COMPREHEN METABOLIC PANEL: CPT

## 2022-11-29 PROCEDURE — 84145 PROCALCITONIN (PCT): CPT

## 2022-11-29 PROCEDURE — 85025 COMPLETE CBC W/AUTO DIFF WBC: CPT

## 2022-11-29 PROCEDURE — 36415 COLL VENOUS BLD VENIPUNCTURE: CPT

## 2022-11-29 PROCEDURE — 2580000003 HC RX 258: Performed by: INTERNAL MEDICINE

## 2022-11-29 PROCEDURE — 94761 N-INVAS EAR/PLS OXIMETRY MLT: CPT

## 2022-11-29 RX ORDER — AMOXICILLIN AND CLAVULANATE POTASSIUM 875; 125 MG/1; MG/1
1 TABLET, FILM COATED ORAL 2 TIMES DAILY
Qty: 14 TABLET | Refills: 0 | Status: SHIPPED | OUTPATIENT
Start: 2022-11-29 | End: 2022-12-06

## 2022-11-29 RX ORDER — AMOXICILLIN AND CLAVULANATE POTASSIUM 875; 125 MG/1; MG/1
1 TABLET, FILM COATED ORAL 2 TIMES DAILY
Qty: 14 TABLET | Refills: 0 | Status: SHIPPED | OUTPATIENT
Start: 2022-11-29 | End: 2022-11-29 | Stop reason: SDUPTHER

## 2022-11-29 RX ORDER — PREDNISONE 20 MG/1
40 TABLET ORAL DAILY
Qty: 4 TABLET | Refills: 0 | Status: SHIPPED | OUTPATIENT
Start: 2022-11-30 | End: 2022-11-29 | Stop reason: SDUPTHER

## 2022-11-29 RX ORDER — ALBUTEROL SULFATE 90 UG/1
1 AEROSOL, METERED RESPIRATORY (INHALATION) 4 TIMES DAILY
Qty: 18 G | Refills: 0 | Status: SHIPPED | OUTPATIENT
Start: 2022-11-29 | End: 2022-11-29 | Stop reason: HOSPADM

## 2022-11-29 RX ORDER — LISINOPRIL 10 MG/1
10 TABLET ORAL DAILY
Qty: 30 TABLET | Refills: 0 | Status: SHIPPED | OUTPATIENT
Start: 2022-11-30

## 2022-11-29 RX ORDER — ALBUTEROL SULFATE 90 UG/1
1 AEROSOL, METERED RESPIRATORY (INHALATION) 4 TIMES DAILY
Qty: 18 G | Refills: 0 | Status: SHIPPED | OUTPATIENT
Start: 2022-11-29

## 2022-11-29 RX ORDER — LISINOPRIL 10 MG/1
10 TABLET ORAL DAILY
Qty: 30 TABLET | Refills: 0 | Status: SHIPPED | OUTPATIENT
Start: 2022-11-30 | End: 2022-11-29 | Stop reason: SDUPTHER

## 2022-11-29 RX ORDER — PREDNISONE 20 MG/1
40 TABLET ORAL DAILY
Qty: 4 TABLET | Refills: 0 | Status: SHIPPED | OUTPATIENT
Start: 2022-11-30 | End: 2022-12-02

## 2022-11-29 RX ORDER — ALBUTEROL SULFATE 90 UG/1
1 AEROSOL, METERED RESPIRATORY (INHALATION) 4 TIMES DAILY
Qty: 18 G | Refills: 0 | Status: SHIPPED | OUTPATIENT
Start: 2022-11-29 | End: 2022-11-29 | Stop reason: SDUPTHER

## 2022-11-29 RX ORDER — PREDNISONE 20 MG/1
40 TABLET ORAL DAILY
Qty: 4 TABLET | Refills: 0 | Status: SHIPPED | OUTPATIENT
Start: 2022-11-30 | End: 2022-11-29 | Stop reason: HOSPADM

## 2022-11-29 RX ORDER — LISINOPRIL 10 MG/1
10 TABLET ORAL DAILY
Qty: 30 TABLET | Refills: 0 | Status: SHIPPED | OUTPATIENT
Start: 2022-11-30 | End: 2022-11-29 | Stop reason: HOSPADM

## 2022-11-29 RX ADMIN — ASPIRIN 81 MG: 81 TABLET, COATED ORAL at 08:41

## 2022-11-29 RX ADMIN — Medication 2 PUFF: at 12:07

## 2022-11-29 RX ADMIN — ALBUTEROL SULFATE 2 PUFF: 90 AEROSOL, METERED RESPIRATORY (INHALATION) at 12:06

## 2022-11-29 RX ADMIN — OMEGA-3-ACID ETHYL ESTERS 2000 MG: 1 CAPSULE, LIQUID FILLED ORAL at 08:41

## 2022-11-29 RX ADMIN — Medication 1 TABLET: at 08:41

## 2022-11-29 RX ADMIN — ENOXAPARIN SODIUM 40 MG: 100 INJECTION SUBCUTANEOUS at 08:42

## 2022-11-29 RX ADMIN — LISINOPRIL 10 MG: 5 TABLET ORAL at 08:41

## 2022-11-29 RX ADMIN — PREDNISONE 40 MG: 20 TABLET ORAL at 08:41

## 2022-11-29 RX ADMIN — PANTOPRAZOLE SODIUM 40 MG: 40 TABLET, DELAYED RELEASE ORAL at 06:31

## 2022-11-29 RX ADMIN — SODIUM CHLORIDE, PRESERVATIVE FREE 10 ML: 5 INJECTION INTRAVENOUS at 08:42

## 2022-11-29 NOTE — PROGRESS NOTES
Outpatient Pharmacy Progress Note for Meds-to-Beds    The Outpatient Pharmacy received prescriptions for patient. The patient, however, is locked into the [address]. Provider informed to provide patient paper Rx's to take to the [place] as the [place] does NOT accept transfer prescriptions from outside pharmacies. Thank you for letting us serve your patients.   [facility]    [address], [address] [facility]    Phone: [phone]    Fax: [phone]

## 2022-11-29 NOTE — PROGRESS NOTES
Discharge instructions reviewed with patient, all questions answered. PIV removed. Patient instructed to keep follow up appointments and to fill prescriptions as soon as possible. Patient verbalized understanding.      LAVERN CalderonN, RN

## 2022-11-29 NOTE — DISCHARGE INSTRUCTIONS
Internal Medicine Discharge Instruction    Discharge to:  Home  Diet: Regular  Activity: As tolerated       Be compliant with medications  Please take medications as prescribed. Please get blood work in a few days.          Electronically signed by Charlie Tyler MD on 11/29/2022 at 12:46 PM

## 2022-11-29 NOTE — DISCHARGE SUMMARY
Discharge Summary    Name:  Kosta Thakkar /Age/Sex:   [de-identified]65 y.o. male)   MRN & CSN:  [id number] & [id number] Admission Date/Time: 2022  8:56 PM   Attending:  No att. providers found Discharging Physician: Fiona Durán MD       Admission Diagnosis:   Acute respiratory failure with hypoxia probably secondary to pneumonia, COPD exacerbation  Sepsis 2/2 #3  Pneumonia  COPD exacerbation  Possible CHF  HAWA vs HAWA on CKD vs CKD  HLD  Macular Degeneration   H/O splenectomy   H/O GERD  H/O Alcohol use disorder    Discharge Diagnosis:  Acute respiratory failure with hypoxia secondary to Streptococcus pneumonia and RSV, COPD exacerbation  Severe sepsis secondary to above 2/2 #1  COPD exacerbation  Transaminitis 2/2 possibly IV abx, R/O other causes  Hyponatremia  Elevated BP  HAWA - Resolved   HLD  Macular Degeneration - AREDS  H/O Splenectomy   H/O GERD   H/O Alcohol use disorder. Discharge Exam  BP (!) 183/96   Pulse 87   Temp 98.5 °F (36.9 °C)   Resp 18   Ht 6' (1.829 m)   Wt 163 lb 3 oz (74 kg)   SpO2 99%   BMI 22.13 kg/m²   Physical Exam  Vitals and nursing note reviewed. Constitutional:       General: He is not in acute distress. Appearance: He is not ill-appearing, toxic-appearing or diaphoretic. HENT:      Head: Normocephalic and atraumatic. Right Ear: External ear normal.      Left Ear: External ear normal.      Nose: Nose normal.      Mouth/Throat:      Mouth: Mucous membranes are moist.      Pharynx: Oropharynx is clear. Eyes:      General: No scleral icterus. Right eye: No discharge. Left eye: No discharge. Conjunctiva/sclera: Conjunctivae normal.      Pupils: Pupils are equal, round, and reactive to light. Cardiovascular:      Rate and Rhythm: Normal rate and regular rhythm. Pulses: Normal pulses. Heart sounds: Normal heart sounds. No murmur heard. No friction rub. No gallop.    Pulmonary:      Effort: Pulmonary effort is normal. No respiratory distress. Breath sounds: Normal breath sounds. No stridor. No wheezing, rhonchi or rales. Abdominal:      General: Bowel sounds are normal. There is no distension. Palpations: Abdomen is soft. There is no mass. Tenderness: There is no abdominal tenderness. There is no guarding or rebound. Hernia: No hernia is present. Musculoskeletal:      Right lower leg: No edema. Left lower leg: No edema. Skin:     Capillary Refill: Capillary refill takes less than 2 seconds. Neurological:      Mental Status: He is alert. Hospital Course:   Bereket Morrison is a 65 y.o.  male  who presents with Acute respiratory failure with hypoxia [facility])    Patient admitted on 11/22/2022    Per H+P:   eBreket Morrison is a 65 y.o. male with hyperlipidemia, macular degeneration, GERD, CKD stage II, history of splenectomy, chronic alcohol use presented to ED with complaints of shortness of breath. Patient complains of cough, with clear sputum, shortness of breath for the last 4 days. Patient denies any fever but admits to having chills. Patient also complained of generalized weakness. Patient denied any headache, visual disturbance, nausea, vomiting, abdominal pain, denied any urinary complaints, denied any constipation or diarrhea. Patient denied any bilateral lower extremity swelling. At presentation patient was noted to have /79, , RR 20, temp 97.7, saturating 93% on room air. Lab work significant for sodium 126, CO2 20, BUN 33, creatinine 1.3, lactic acid 2.4, proBNP 988, troponin<0.010, WBC 24, albumin 2.5, AST 49, total bilirubin 1.2. VBG-pH 7.45, PCO2 22, PO2 53, HCO3 15.3. Rapid influenza, COVID-19 negative. Chest x-ray-multifocal bilateral patchy opacities more pronounced in the right upper lobe, differential considerations include multifocal pneumonia and asymmetric pulmonary edema. Patient received ceftriaxone, azithromycin in ED. Viral panel +ve for RSV.  Strep pneumo urinary antigen +ve. CTA showed multifocal ground glass and dense airspace consolidations with concern for infection. Patient's LFT was uptrending - ceftriaxone stopped. Procal was trending down. GI on board - sent  RODY, ASMA, anti-L KM, AMA EBV, CMV, alpha-1 antitrypsin, ceruloplasmin. Elevated Ferritin as well. Patient's white count also elevated. Patient was weaned off o2. Patient stable enough to discharge home today. The patient expressed appropriate understanding of and agreement with the discharge recommendations, medications, and plan. Consults this admission:  IP CONSULT TO GI      Discharge Instruction:   Handoff to PCP:     Follow up appointments: With PCP within 1 week. With GI within 1 week. Primary care physician: Discharge instructions as given to patient:   Be compliant with medications  Please take medications as prescribed. Please get blood work in a few days.      Diet:  regular diet   Activity: activity as tolerated  Disposition: Discharged to:   [x]Home, []C, []SNF, []Acute Rehab, []Hospice   Condition on discharge: Stable    Discharge Medications:        Medication List        START taking these medications      albuterol sulfate  (90 Base) MCG/ACT inhaler  Commonly known as: PROVENTIL;VENTOLIN;PROAIR  Inhale 1 puff into the lungs 4 times daily     amoxicillin-clavulanate 875-125 MG per tablet  Commonly known as: AUGMENTIN  Take 1 tablet by mouth 2 times daily for 7 days     ipratropium 17 MCG/ACT inhaler  Commonly known as: ATROVENT HFA  Inhale 2 puffs into the lungs every 4 hours (while awake)     lisinopril 10 MG tablet  Commonly known as: PRINIVIL;ZESTRIL  Take 1 tablet by mouth daily  Start taking on: November 30, 2022     predniSONE 20 MG tablet  Commonly known as: DELTASONE  Take 2 tablets by mouth daily for 2 doses  Start taking on: November 30, 2022            CONTINUE taking these medications      aspirin 81 MG EC tablet     cyanocobalamin 1000 MCG tablet     fish oil 1000 MG capsule     pantoprazole 20 MG tablet  Commonly known as: PROTONIX     vitamin D 25 MCG (1000 UT) Tabs tablet  Commonly known as: CHOLECALCIFEROL     Viteyes AREDS Formula/Lutein Caps            STOP taking these medications      meloxicam 15 MG tablet  Commonly known as: MOBIC     simvastatin 20 MG tablet  Commonly known as: ZOCOR               Where to Get Your Medications        You can get these medications from any pharmacy    Bring a paper prescription for each of these medications  albuterol sulfate  (90 Base) MCG/ACT inhaler  amoxicillin-clavulanate 875-125 MG per tablet  ipratropium 17 MCG/ACT inhaler  lisinopril 10 MG tablet  predniSONE 20 MG tablet         Objective Findings at Discharge:       BMP/CBC  Recent Labs     11/27/22  0643 11/28/22  0528 11/29/22  0348   * 134* 131*   K 4.7 4.6 4.7    98* 98*   CO2 23 24 22   BUN 28* 26* 23   CREATININE 0.9 0.9 0.8*   WBC 10.0 13.4* 13.5*   HCT 45.4 44.8 45.4    508* 536*       IMAGING:  US Abdomen RUQ 11/25/2022   Fatty infiltration of the liver       Cholecystectomy       CTA Pulm with contrast 11/23/2022   No evidence of central pulmonary embolus. The peripheral arteries are   limited in evaluation. Multifocal ground-glass and dense airspace consolidations, concerning for   infection. Follow-up imaging to document resolution is recommended. Lymphadenopathy, nonspecific and possibly reactive. Additional Information: Patient seen and examined day of discharge.  For more information regarding patient's care please contact [facility] Quintana [facility] records [phone]    Discharge Time of 45 minutes    Electronically signed by Esteban Hensley MD on 11/29/2022 at 3:58 PM

## 2022-11-29 NOTE — PROGRESS NOTES
[address] [facility] Gastroenterology and Hepatology             MD Terry Perea MD Herlinda Nieves, APRN-CNP             [address] [place], [address] [facility]             [phone] fax [phone]      Gastroenterology Progress note . 11/29/2022  Reason for consult:   Elevated LFTs    Late entry evaluated in the AM.       Interval H/P  Patient noted to be sitting in bedside chair, on supplemental oxygen. Mentions he feels great and wants to be discharged. Anxious to go home. LFTs noted to be plateauing. Physical Exam  Blood pressure (!) 183/96, pulse 87, temperature 98.5 °F (36.9 °C), resp. rate 18, height 6' (1.829 m), weight 163 lb 3 oz (74 kg), SpO2 99 %. Constitutional: Patient is in no distress. Eyes: Pupils equal, round. No icterus. HENT: Oral mucosa is moist.   Pulmonary: No accessory muscle use. No localizing pulmonary findings. Cardiovascular: Heart has a regular rate and rhythm, no JVD. Gastrointestinal: Bowel sounds are present in all four quadrants. Abdomen is soft, nontender, nondistended. Rectal examination deferred. Skin: No jaundice, spider angiomas, or palmar erythema. No purpura. Neuro: Awake,alert, and oriented x3. No gross focal neurologic deficits. Chart and labs reviewed. IMPRESSION:  1) elevated LFTs: Noted to uptrend throughout his hospitalization. Predominantly hepatocellular with elevated AST, ALT and ALP, normal bilirubin. Possible etiologies include drug-induced secondary to use of IV antibiotics. Patient received ceftriaxone, azithromycin versus secondary to sepsis. Right upper quadrant ultrasound with cholecystectomy, no extra or intrahepatic biliary ductal dilation, fatty infiltration of the liver. Acute hepatitis panel negative  2) acute hypoxic respiratory failure secondary to pneumonia. Strep pneumo antigen positive, respiratory PCR positive for RSV.   Currently on ceftriaxone, completed azithromycin, on steroid  3) COPD  4) HAWA resolving     RECOMMENDATIONS:  1) continue to trend LFTs. 2) discussed with the patient that if he is discharged he will need repeat LFTs in 2 to 3 days and close follow-up with PCP and follow-up with me in the office in 1 week. 3) agree with holding statin  4) can use acetaminophen less than 2 g.  5) Await  RODY, ASMA, anti-L KM, AMA EBV, CMV, alpha-1 antitrypsin, ceruloplasmin. Note made of elevated Ferritin however transferrin sat 32%    Patient's history, exam, and plan of care were discussed. Thank you for allowing us to be involved in the management of this patient. We will follow this patient along with you. Please feel free to call with any questions.       Giselle Nunes MD  Gastroenterology   11/29/2022   3:05 PM       Recent Labs     11/27/22  7288 11/28/22  0528 11/29/22  0348   * 130* 137*   * 427* 437*   ALKPHOS 339* 337* 320*   BILITOT 0.6 0.6 0.7   BILIDIR  --  0.3  --

## 2022-11-29 NOTE — PLAN OF CARE
Problem: Pain  Goal: Verbalizes/displays adequate comfort level or baseline comfort level  11/29/2022 0033 by Maxx Umanzor RN  Outcome: Progressing  11/28/2022 1446 by Magda Perez RN  Outcome: Progressing  Flowsheets (Taken 11/28/2022 0915)  Verbalizes/displays adequate comfort level or baseline comfort level:   Encourage patient to monitor pain and request assistance   Assess pain using appropriate pain scale   Administer analgesics based on type and severity of pain and evaluate response   Implement non-pharmacological measures as appropriate and evaluate response   Consider cultural and social influences on pain and pain management   Notify Licensed Independent Practitioner if interventions unsuccessful or patient reports new pain     Problem: Skin/Tissue Integrity  Goal: Absence of new skin breakdown  Description: 1. Monitor for areas of redness and/or skin breakdown  2. Assess vascular access sites hourly  3. Every 4-6 hours minimum:  Change oxygen saturation probe site  4. Every 4-6 hours:  If on nasal continuous positive airway pressure, respiratory therapy assess nares and determine need for appliance change or resting period.   11/29/2022 0033 by Maxx Umanzor RN  Outcome: Progressing  11/28/2022 1446 by Magda Perez RN  Outcome: Progressing

## 2022-11-30 LAB
ANTI-MITOCHON TITER: 135 UNITS (ref 0–24.9)
ANTI-MITOCHON TITER: 141.2 UNITS (ref 0–24.9)
ANTI-NUCLEAR ANTIBODY (ANA): DETECTED
CMV IGM: <8 AU/ML
EBV EARLY ANTIGEN AB, IGG: <5 U/ML (ref 0–10.9)
EBV NUCLEAR AG AB: 31.9 U/ML (ref 0–21.9)
EPSTEIN-BARR VCA IGG: 108 U/ML (ref 0–21.9)
EPSTEIN-BARR VCA IGM: <10 U/ML (ref 0–43.9)
F-ACTIN AB, IGG: 7 UNITS (ref 0–19)

## 2022-12-02 LAB
ANTINUCLEAR ANTIBODY, HEP-2, IGG: NORMAL
INTERPRETATION: NORMAL

## 2022-12-09 DIAGNOSIS — R74.01 TRANSAMINITIS: Primary | ICD-10-CM

## 2022-12-19 DIAGNOSIS — R74.01 TRANSAMINITIS: ICD-10-CM

## 2022-12-19 LAB
A/G RATIO: 1.3 (ref 1.1–2.2)
ALBUMIN SERPL-MCNC: 3.7 G/DL (ref 3.4–5)
ALP BLD-CCNC: 257 U/L (ref 40–129)
ALT SERPL-CCNC: 24 U/L (ref 10–40)
ANION GAP SERPL CALCULATED.3IONS-SCNC: 13 MMOL/L (ref 3–16)
AST SERPL-CCNC: 22 U/L (ref 15–37)
BILIRUB SERPL-MCNC: 0.3 MG/DL (ref 0–1)
BUN BLDV-MCNC: 9 MG/DL (ref 7–20)
CALCIUM SERPL-MCNC: 9.7 MG/DL (ref 8.3–10.6)
CHLORIDE BLD-SCNC: 99 MMOL/L (ref 99–110)
CO2: 24 MMOL/L (ref 21–32)
CREAT SERPL-MCNC: 1.1 MG/DL (ref 0.8–1.3)
GFR SERPL CREATININE-BSD FRML MDRD: >60 ML/MIN/{1.73_M2}
GLUCOSE BLD-MCNC: 107 MG/DL (ref 70–99)
POTASSIUM SERPL-SCNC: 5.3 MMOL/L (ref 3.5–5.1)
SODIUM BLD-SCNC: 136 MMOL/L (ref 136–145)
TOTAL PROTEIN: 6.5 G/DL (ref 6.4–8.2)

## 2022-12-20 ENCOUNTER — OFFICE VISIT (OUTPATIENT)
Dept: GASTROENTEROLOGY | Age: 77
End: 2022-12-20
Payer: OTHER GOVERNMENT

## 2022-12-20 VITALS
TEMPERATURE: 97.4 F | HEIGHT: 72 IN | SYSTOLIC BLOOD PRESSURE: 112 MMHG | BODY MASS INDEX: 21.18 KG/M2 | HEART RATE: 70 BPM | DIASTOLIC BLOOD PRESSURE: 60 MMHG | WEIGHT: 156.4 LBS | OXYGEN SATURATION: 99 %

## 2022-12-20 DIAGNOSIS — R79.89 ELEVATED LFTS: Primary | ICD-10-CM

## 2022-12-20 DIAGNOSIS — K76.0 FATTY LIVER: ICD-10-CM

## 2022-12-20 DIAGNOSIS — K74.3 PRIMARY BILIARY CHOLANGITIS (HCC): ICD-10-CM

## 2022-12-20 PROCEDURE — 1123F ACP DISCUSS/DSCN MKR DOCD: CPT | Performed by: INTERNAL MEDICINE

## 2022-12-20 PROCEDURE — 99214 OFFICE O/P EST MOD 30 MIN: CPT | Performed by: INTERNAL MEDICINE

## 2022-12-20 RX ORDER — URSODIOL 300 MG/1
300 CAPSULE ORAL 2 TIMES DAILY
Qty: 60 CAPSULE | Refills: 3 | Status: SHIPPED | OUTPATIENT
Start: 2022-12-20

## 2022-12-20 NOTE — PROGRESS NOTES
[address] [facility] Gastroenterology and Hepatology             MD Tyron Buck MD Nilda Corwin, APRN-CNP             [address] [facility], [address] [facility]             [phone] fax [phone]        Gastroenterology Clinic Consultation    Tyron Browne MD  Encounter Date: 12/20/22     CC: Follow-up and Results       No referring provider defined for this encounter. History obtained from: patient, medical records     Subjective:       Hershal Landau is an 65 y. o.  male with past medical history of hyperlipidemia, macular degeneration, GERD, CKD stage II, history of splenectomy, alcohol use who presents for Follow-up and Results    Patient was evaluated by me in the hospital for elevated LFTs. He was admitted to the hospital with acute hypoxic respiratory failure secondary to RSV and at that time had was noted to have significant leukocytosis and elevated LFTs. He had received multiple antibiotics during his hospitalization. He does have history of alcohol use and does admit to drinking 3-4 beers 2-3 times per week. Right upper quadrant ultrasound with CBD with normal limits, fatty infiltration in the liver. LFTs were noted to be predominantly hepatocellular. He had a repeat LFT just done yesterday which shows resolution of his ALT and AST as well as his total bilirubin. Alkaline phosphatase is down trended but still elevated at 257. RODY, ASMA negative, alpha-1 antitrypsin elevated, ceruloplasmin low. Noted to be positive for AMA x 2. Currently denies any nausea, vomiting, chest pain, hematochezia, melena. Does have GERD intermittently will take TUMS. [facility] and was told he didn't need another one. Patient Active Problem List   Diagnosis    Acute respiratory failure with hypoxia (HCC)    Elevated LFTs    Fatty liver    Primary biliary cholangitis ([facility] [place] [address]         No past medical history on file.      Past Surgical History:   Procedure Laterality Date    CHOLECYSTECTOMY          No family history on file. Social History     Socioeconomic History    Marital status:      Spouse name: Not on file    Number of children: Not on file    Years of education: Not on file    Highest education level: Not on file   Occupational History    Not on file   Tobacco Use    Smoking status: Former     Packs/day: 1.00     Years: 50.00     Pack years: 50.00     Types: Cigarettes     Start date: 45     Quit date: 2012     Years since quitting: 10.9    Smokeless tobacco: Never   Vaping Use    Vaping Use: Some days    Substances: Nicotine    Devices: Refillable tank   Substance and Sexual Activity    Alcohol use: Not Currently    Drug use: Not Currently    Sexual activity: Not on file   Other Topics Concern    Not on file   Social History Narrative    Not on file     Social Determinants of Health     Financial Resource Strain: Not on file   Food Insecurity: Not on file   Transportation Needs: Not on file   Physical Activity: Not on file   Stress: Not on file   Social Connections: Not on file   Intimate Partner Violence: Not on file   Housing Stability: Not on file        Social History     Social History Narrative    Not on file           Review of Systems  Reviewed all 14 systems with patient/family member. Pertinent items in HPI. Medications:    Current Outpatient Medications:     SIMVASTATIN PO, Take by mouth Pt.  Unsure of MG but knows he only takes half a tab, Disp: , Rfl:     ursodiol (ACTIGALL) 300 MG capsule, Take 1 capsule by mouth 2 times daily, Disp: 60 capsule, Rfl: 3    albuterol sulfate HFA (PROVENTIL;VENTOLIN;PROAIR) 108 (90 Base) MCG/ACT inhaler, Inhale 1 puff into the lungs 4 times daily, Disp: 18 g, Rfl: 0    ipratropium (ATROVENT HFA) 17 MCG/ACT inhaler, Inhale 2 puffs into the lungs every 4 hours (while awake), Disp: 12.9 g, Rfl: 0    vitamin D (CHOLECALCIFEROL) 25 MCG (1000 UT) TABS tablet, Take 2,000 Units by mouth daily, Disp: , Rfl:     Omega-3 Fatty Acids (FISH OIL) 1000 MG capsule, Take 2,000 mg by mouth daily, Disp: , Rfl:     Multiple Vitamins-Minerals (VITEYES AREDS FORMULA/LUTEIN) CAPS, Take by mouth, Disp: , Rfl:     pantoprazole (PROTONIX) 20 MG tablet, Take 40 mg by mouth daily, Disp: , Rfl:     lisinopril (PRINIVIL;ZESTRIL) 10 MG tablet, Take 1 tablet by mouth daily (Patient not taking: Reported on 12/20/2022), Disp: 30 tablet, Rfl: 0    aspirin 81 MG EC tablet, Take 81 mg by mouth daily (Patient not taking: Reported on 12/20/2022), Disp: , Rfl:     cyanocobalamin 1000 MCG tablet, Take 1,000 mcg by mouth daily (Patient not taking: Reported on 12/20/2022), Disp: , Rfl:      Allergies:  Patient has no known allergies. Objective:    Blood pressure 112/60, pulse 70, temperature 97.4 °F (36.3 °C), temperature source Infrared, height 6' (1.829 m), weight 156 lb 6.4 oz (70.9 kg), SpO2 99 %. General appearance: alert, cooperative, no distress, appears stated age  Head: Normocephalic, without obvious abnormality, atraumatic  Eyes: conjunctivae/corneas clear. EOM's intact. Nose: Nares normal. No discharge  Throat: Lips, mucosa, and tongue normal. Teeth and gums normal  Neck: supple, symmetrical, trachea midline and no adenopathy  Back: symmetric, no curvature. No CVA tenderness. , no kyphosis present, no scoliosis present  Lungs: clear to auscultation bilaterally, no wheezes, rales, or ronchi, normal respiratory effort  Heart: regular rate and rhythm, S1, S2 normal, no murmur, click, rub or gallop  Abdomen: soft, non-tender.  No masses,  no hepatospleenomegally  Extremities: extremities normal, atraumatic, no cyanosis or edema  Skin: Skin color, texture, turgor normal. No rashes or lesions  Neurologic: Non focal, speech clear,   Psychiatry: Mood appropriate, no evidence of psychosis        Labs: Reviewed last labs/outside records          Assessment and Plan:  Silvio Gómez was seen today for follow-up and results. Diagnoses and all orders for this visit:    Elevated LFTs    Fatty liver    Primary biliary cholangitis ([facility] [address]  -     TSH with Reflex to FT4; Future  -     Vitamin D 25 Hydroxy; Future  -     Hepatic Function Panel; Future  -     LIPID PANEL; Future    Other orders  -     ursodiol (ACTIGALL) 300 MG capsule; Take 1 capsule by mouth 2 times daily     Diagnosis Orders   1. Elevated LFTs        2. Fatty liver        3. Primary biliary cholangitis (HCC)  TSH with Reflex to FT4    Vitamin D 25 Hydroxy    Hepatic Function Panel    LIPID PANEL        Orders Placed This Encounter   Procedures    TSH with Reflex to FT4     Standing Status:   Future     Standing Expiration Date:   12/20/2023    Vitamin D 25 Hydroxy     Standing Status:   Future     Standing Expiration Date:   12/20/2023    Hepatic Function Panel     Standing Status:   Future     Standing Expiration Date:   12/20/2023    LIPID PANEL     Standing Status:   Future     Standing Expiration Date:   12/20/2023       1) elevated LFTs: Noted to uptrend throughout his hospitalization. Predominantly hepatocellular with elevated AST, ALT and ALP, normal bilirubin. Possible etiologies include drug-induced secondary to use of IV antibiotics. Patient received ceftriaxone, azithromycin versus secondary to sepsis. Right upper quadrant ultrasound with cholecystectomy, no extra or intrahepatic biliary ductal dilation, fatty infiltration of the liver. Acute hepatitis panel negative. He had a repeat LFT just done yesterday which shows resolution of his ALT and AST as well as his total bilirubin. Alkaline phosphatase is down trended but still elevated at 257. RODY, ASMA negative, alpha-1 antitrypsin elevated, ceruloplasmin low. Noted to be positive for AMA x 2.   -This is consistent with primary biliary cholangitis. 2) PBC   PBC Management:   - Liver labs as above.    -US with no biliary dilation.   -With persistent elevated alkaline phosphatase, and significantly positive antimitochondrial antibody pt does have a diagnosis of primary biliary cholangitis. -Patient will be started on ursodiol 300 mg twice daily. -will need repeat LFTs in 3 months. - Metabollic bone disease:  DEXA scan every 2 years, Vit D level checked annually and 1000 to 1500 mg of calcium and 1000 International Units of vitamin D daily in the diet and as supplements if needed. Treatment of osteoporosis if T score > 2.5   -TSH annually  -Currently not cirrhotic.   - Lipid panel.   - Instructed to follow up and establish with PCP. No follow-ups on file.     Tracy Barillas MD 12/20/2022 1:56 PM     Time of note may not reflect time of encounter     CC: Referring MD

## 2023-02-16 DIAGNOSIS — K74.3 PRIMARY BILIARY CHOLANGITIS (HCC): ICD-10-CM

## 2023-02-16 LAB
ALBUMIN SERPL-MCNC: 4 G/DL (ref 3.4–5)
ALP BLD-CCNC: 124 U/L (ref 40–129)
ALT SERPL-CCNC: 12 U/L (ref 10–40)
AST SERPL-CCNC: 20 U/L (ref 15–37)
BILIRUB SERPL-MCNC: 0.5 MG/DL (ref 0–1)
BILIRUBIN DIRECT: <0.2 MG/DL (ref 0–0.3)
BILIRUBIN, INDIRECT: NORMAL MG/DL (ref 0–1)
CHOLESTEROL, TOTAL: 219 MG/DL (ref 0–199)
HDLC SERPL-MCNC: 40 MG/DL (ref 40–60)
LDL CHOLESTEROL CALCULATED: 124 MG/DL
T4 FREE: 0.9 NG/DL (ref 0.9–1.8)
TOTAL PROTEIN: 6.5 G/DL (ref 6.4–8.2)
TRIGL SERPL-MCNC: 277 MG/DL (ref 0–150)
TSH REFLEX FT4: 5.07 UIU/ML (ref 0.27–4.2)
VITAMIN D 25-HYDROXY: 34.8 NG/ML
VLDLC SERPL CALC-MCNC: 55 MG/DL

## 2023-02-22 ENCOUNTER — OFFICE VISIT (OUTPATIENT)
Dept: GASTROENTEROLOGY | Age: 78
End: 2023-02-22
Payer: OTHER GOVERNMENT

## 2023-02-22 VITALS
SYSTOLIC BLOOD PRESSURE: 116 MMHG | TEMPERATURE: 97.3 F | OXYGEN SATURATION: 91 % | BODY MASS INDEX: 20.78 KG/M2 | WEIGHT: 153.4 LBS | HEIGHT: 72 IN | DIASTOLIC BLOOD PRESSURE: 72 MMHG | HEART RATE: 63 BPM

## 2023-02-22 DIAGNOSIS — K74.3 PRIMARY BILIARY CHOLANGITIS (HCC): Primary | ICD-10-CM

## 2023-02-22 PROCEDURE — 1123F ACP DISCUSS/DSCN MKR DOCD: CPT | Performed by: INTERNAL MEDICINE

## 2023-02-22 PROCEDURE — 99214 OFFICE O/P EST MOD 30 MIN: CPT | Performed by: INTERNAL MEDICINE

## 2023-02-22 RX ORDER — URSODIOL 300 MG/1
300 CAPSULE ORAL 2 TIMES DAILY
Qty: 60 CAPSULE | Refills: 3 | Status: SHIPPED | OUTPATIENT
Start: 2023-02-22

## 2023-02-22 NOTE — PROGRESS NOTES
[address] [facility] Gastroenterology and Hepatology             MD Patricia Gomez MD             Clara Hernandez, APRN-CNP             [address] [facility] [place], [address] [facility]             [phone] fax [phone]        Gastroenterology Clinic Consultation    Patricia Richardson MD  Encounter Date: 02/22/23     CC: Follow-up       No referring provider defined for this encounter. History obtained from: patient, medical records     Subjective:       Lewis Mensah is an 65 y. o.  male with past medical history of hyperlipidemia, macular degeneration, GERD, CKD stage II, history of splenectomy, alcohol use who presents for Follow-up  February 2023  Since last visit the patient's repeat liver function test done on February 16, 2023 I have noted to have normalization of alkaline phosphatase. His triglyceride and cholesterol levels are elevated. TSH with elevation however free T4 low normal raising concern for subclinical hypothyroidism. Vitamin D levels normal.  Currently denies any nausea, vomiting, chest pain, hematochezia, melena. Mentions he is established with a PCP at the [facility]. December 2022  Patient was evaluated by me in the hospital for elevated LFTs. He was admitted to the hospital with acute hypoxic respiratory failure secondary to RSV and at that time had was noted to have significant leukocytosis and elevated LFTs. He had received multiple antibiotics during his hospitalization. He does have history of alcohol use and does admit to drinking 3-4 beers 2-3 times per week. Right upper quadrant ultrasound with CBD with normal limits, fatty infiltration in the liver. LFTs were noted to be predominantly hepatocellular. He had a repeat LFT just done yesterday which shows resolution of his ALT and AST as well as his total bilirubin. Alkaline phosphatase is down trended but still elevated at 257.   RODY, ASMA negative, alpha-1 antitrypsin elevated, ceruloplasmin low. Noted to be positive for AMA x 2. Currently denies any nausea, vomiting, chest pain, hematochezia, melena. Does have GERD intermittently will take TUMS. [facility] and was told he didn't need another one. Patient Active Problem List   Diagnosis    Acute respiratory failure with hypoxia (HCC)    Elevated LFTs    Fatty liver    Primary biliary cholangitis ([facility] [address]         No past medical history on file. Past Surgical History:   Procedure Laterality Date    CHOLECYSTECTOMY          No family history on file. Social History     Socioeconomic History    Marital status:      Spouse name: Not on file    Number of children: Not on file    Years of education: Not on file    Highest education level: Not on file   Occupational History    Not on file   Tobacco Use    Smoking status: Former     Packs/day: 1.00     Years: 50.00     Pack years: 50.00     Types: Cigarettes     Start date: 45     Quit date:      Years since quittin.1    Smokeless tobacco: Never   Vaping Use    Vaping Use: Some days    Substances: Nicotine    Devices: Refillable tank   Substance and Sexual Activity    Alcohol use: Not Currently    Drug use: Not Currently    Sexual activity: Not on file   Other Topics Concern    Not on file   Social History Narrative    Not on file     Social Determinants of Health     Financial Resource Strain: Not on file   Food Insecurity: Not on file   Transportation Needs: Not on file   Physical Activity: Not on file   Stress: Not on file   Social Connections: Not on file   Intimate Partner Violence: Not on file   Housing Stability: Not on file        Social History     Social History Narrative    Not on file           Review of Systems  Reviewed all 14 systems with patient/family member. Pertinent items in HPI. Medications:    Current Outpatient Medications:     SIMVASTATIN PO, Take by mouth Pt.  Unsure of MG but knows he only takes half a tab, Disp: , Rfl:     ursodiol (ACTIGALL) 300 MG capsule, Take 1 capsule by mouth 2 times daily, Disp: 60 capsule, Rfl: 3    aspirin 81 MG EC tablet, Take 81 mg by mouth daily, Disp: , Rfl:     vitamin D (CHOLECALCIFEROL) 25 MCG (1000 UT) TABS tablet, Take 2,000 Units by mouth daily, Disp: , Rfl:     cyanocobalamin 1000 MCG tablet, Take 1,000 mcg by mouth daily, Disp: , Rfl:     Omega-3 Fatty Acids (FISH OIL) 1000 MG capsule, Take 2,000 mg by mouth daily, Disp: , Rfl:     Multiple Vitamins-Minerals (VITEYES AREDS FORMULA/LUTEIN) CAPS, Take by mouth, Disp: , Rfl:     pantoprazole (PROTONIX) 20 MG tablet, Take 40 mg by mouth daily, Disp: , Rfl:     albuterol sulfate HFA (PROVENTIL;VENTOLIN;PROAIR) 108 (90 Base) MCG/ACT inhaler, Inhale 1 puff into the lungs 4 times daily (Patient not taking: Reported on 2/22/2023), Disp: 18 g, Rfl: 0    ipratropium (ATROVENT HFA) 17 MCG/ACT inhaler, Inhale 2 puffs into the lungs every 4 hours (while awake) (Patient not taking: Reported on 2/22/2023), Disp: 12.9 g, Rfl: 0    lisinopril (PRINIVIL;ZESTRIL) 10 MG tablet, Take 1 tablet by mouth daily (Patient not taking: No sig reported), Disp: 30 tablet, Rfl: 0     Allergies:  Patient has no known allergies. Objective:    Blood pressure 116/72, pulse 63, temperature 97.3 °F (36.3 °C), temperature source Infrared, height 6' (1.829 m), weight 153 lb 6.4 oz (69.6 kg), SpO2 91 %. General appearance: alert, cooperative, no distress, appears stated age  Head: Normocephalic, without obvious abnormality, atraumatic  Eyes: conjunctivae/corneas clear. EOM's intact. Nose: Nares normal. No discharge  Throat: Lips, mucosa, and tongue normal. Teeth and gums normal  Neck: supple, symmetrical, trachea midline and no adenopathy  Back: symmetric, no curvature. No CVA tenderness. , no kyphosis present, no scoliosis present  Lungs: clear to auscultation bilaterally, no wheezes, rales, or ronchi, normal respiratory effort  Heart: regular rate and rhythm, S1, S2 normal, no murmur, click, rub or gallop  Abdomen: soft, non-tender. No masses,  no hepatospleenomegally  Extremities: extremities normal, atraumatic, no cyanosis or edema  Skin: Skin color, texture, turgor normal. No rashes or lesions  Neurologic: Non focal, speech clear,   Psychiatry: Mood appropriate, no evidence of psychosis        Labs: Reviewed last labs/outside records          Assessment and Plan:  Terrell Zee was seen today for follow-up. Diagnoses and all orders for this visit:    Primary biliary cholangitis ([place] [address]     Diagnosis Orders   1. Primary biliary cholangitis (HCC)          No orders of the defined types were placed in this encounter. 1) PBC   PBC Management:   -  Right upper quadrant ultrasound with cholecystectomy, no extra or intrahepatic biliary ductal dilation, fatty infiltration of the liver. Acute hepatitis panel negative. He had a repeat LFT just done yesterday which shows resolution of his ALT and AST as well as his total bilirubin. Alkaline phosphatase is down trended but still elevated at 257. RODY, ASMA negative, alpha-1 antitrypsin elevated, ceruloplasmin low. Noted to be positive for AMA x 2.   -US with no biliary dilation.   -With persistent elevated alkaline phosphatase, and significantly positive antimitochondrial antibody pt does have a diagnosis of primary biliary cholangitis. -Patient will be started on ursodiol 300 mg twice daily. -Repeat LFTs done on 02/16/2023 noted to have normal alkaline phosphatase  - Metabollic bone disease:  DEXA scan every 2 years, Vit D level checked annually and 1000 to 1500 mg of calcium and 1000 International Units of vitamin D daily in the diet and as supplements if needed.  Treatment of osteoporosis if T score > 2.5   -TSH annually, most recent TSH noted to be elevated however normal free T4 raising question for subclinical hypothyroidism  -Currently not cirrhotic.   - Lipid panel -ordered noted to have elevated cholesterol, he mentions that he has established with a PCP at the [place] office. He does not remember the name. However in order to facilitate a sooner appointment than the one he has we will print a copy of our clinic visit from today and tell him to follow-up with his [place] PCP in about 1 to 2 weeks for his elevated cholesterol and subclinical hypothyroidism. No follow-ups on file.     Lara Ulloa MD 2/22/2023 10:13 AM     Time of note may not reflect time of encounter     CC: Referring MD

## 2023-08-08 ENCOUNTER — HOSPITAL ENCOUNTER (OUTPATIENT)
Age: 78
Discharge: HOME | End: 2023-08-08
Payer: OTHER GOVERNMENT

## 2023-08-08 DIAGNOSIS — K74.3 PRIMARY BILIARY CHOLANGITIS (HCC): ICD-10-CM

## 2023-08-08 LAB
ALBUMIN SERPL-MCNC: 3.8 GM/DL (ref 3.4–5)
ALP BLD-CCNC: 132 IU/L (ref 40–128)
ALT SERPL-CCNC: 18 U/L (ref 10–40)
ANION GAP SERPL CALCULATED.3IONS-SCNC: 10 MMOL/L (ref 4–16)
AST SERPL-CCNC: 27 IU/L (ref 15–37)
BILIRUB SERPL-MCNC: 0.5 MG/DL (ref 0–1)
BUN SERPL-MCNC: 7 MG/DL (ref 6–23)
CALCIUM SERPL-MCNC: 9.2 MG/DL (ref 8.3–10.6)
CHLORIDE BLD-SCNC: 98 MMOL/L (ref 99–110)
CO2: 26 MMOL/L (ref 21–32)
CREAT SERPL-MCNC: 1.2 MG/DL (ref 0.9–1.3)
GFR SERPL CREATININE-BSD FRML MDRD: >60 ML/MIN/1.73M2
GLUCOSE SERPL-MCNC: 107 MG/DL (ref 70–99)
POTASSIUM SERPL-SCNC: 5.2 MMOL/L (ref 3.5–5.1)
SODIUM BLD-SCNC: 134 MMOL/L (ref 135–145)
TOTAL PROTEIN: 6.3 GM/DL (ref 6.4–8.2)

## 2023-08-08 PROCEDURE — 36415 COLL VENOUS BLD VENIPUNCTURE: CPT

## 2023-08-08 PROCEDURE — 80053 COMPREHEN METABOLIC PANEL: CPT

## 2023-08-22 ENCOUNTER — OFFICE VISIT (OUTPATIENT)
Dept: GASTROENTEROLOGY | Age: 78
End: 2023-08-22
Payer: OTHER GOVERNMENT

## 2023-08-22 VITALS
HEIGHT: 72 IN | WEIGHT: 161.8 LBS | HEART RATE: 71 BPM | BODY MASS INDEX: 21.91 KG/M2 | OXYGEN SATURATION: 96 % | SYSTOLIC BLOOD PRESSURE: 132 MMHG | DIASTOLIC BLOOD PRESSURE: 72 MMHG | TEMPERATURE: 98.4 F

## 2023-08-22 DIAGNOSIS — K76.0 FATTY LIVER: ICD-10-CM

## 2023-08-22 DIAGNOSIS — K74.3 PRIMARY BILIARY CHOLANGITIS (HCC): Primary | ICD-10-CM

## 2023-08-22 PROCEDURE — 1123F ACP DISCUSS/DSCN MKR DOCD: CPT | Performed by: NURSE PRACTITIONER

## 2023-08-22 PROCEDURE — 99213 OFFICE O/P EST LOW 20 MIN: CPT | Performed by: NURSE PRACTITIONER

## 2023-08-22 ASSESSMENT — ENCOUNTER SYMPTOMS
DIARRHEA: 0
COUGH: 1
COLOR CHANGE: 0
ABDOMINAL PAIN: 0
VOMITING: 0
SHORTNESS OF BREATH: 0
BACK PAIN: 1
BLOOD IN STOOL: 0
NAUSEA: 0
CONSTIPATION: 0
EYE PAIN: 0
EYE DISCHARGE: 0

## 2023-08-29 ENCOUNTER — TELEPHONE (OUTPATIENT)
Dept: GASTROENTEROLOGY | Age: 78
End: 2023-08-29

## 2023-08-29 RX ORDER — URSODIOL 300 MG/1
300 CAPSULE ORAL 2 TIMES DAILY
Qty: 60 CAPSULE | Refills: 3 | Status: SHIPPED | OUTPATIENT
Start: 2023-08-29

## 2023-08-29 NOTE — TELEPHONE ENCOUNTER
Patient states he needs a new script for ursoldiol  300 MG caspule sent to [address] [facility].

## 2023-11-29 ENCOUNTER — HOSPITAL ENCOUNTER (OUTPATIENT)
Age: 78
Discharge: HOME | End: 2023-11-29
Payer: OTHER GOVERNMENT

## 2023-11-29 LAB
ALBUMIN SERPL-MCNC: 4 GM/DL (ref 3.4–5)
ALP BLD-CCNC: 133 IU/L (ref 40–128)
ALT SERPL-CCNC: 10 U/L (ref 10–40)
ANION GAP SERPL CALCULATED.3IONS-SCNC: 12 MMOL/L (ref 4–16)
AST SERPL-CCNC: 15 IU/L (ref 15–37)
BILIRUB SERPL-MCNC: 0.5 MG/DL (ref 0–1)
BUN SERPL-MCNC: 14 MG/DL (ref 6–23)
CALCIUM SERPL-MCNC: 9.3 MG/DL (ref 8.3–10.6)
CHLORIDE BLD-SCNC: 100 MMOL/L (ref 99–110)
CO2: 25 MMOL/L (ref 21–32)
CREAT SERPL-MCNC: 1.4 MG/DL (ref 0.9–1.3)
GFR SERPL CREATININE-BSD FRML MDRD: 51 ML/MIN/1.73M2
GLUCOSE SERPL-MCNC: 88 MG/DL (ref 70–99)
POTASSIUM SERPL-SCNC: 4.8 MMOL/L (ref 3.5–5.1)
SODIUM BLD-SCNC: 137 MMOL/L (ref 135–145)
TOTAL PROTEIN: 6.5 GM/DL (ref 6.4–8.2)

## 2023-11-29 PROCEDURE — 36415 COLL VENOUS BLD VENIPUNCTURE: CPT

## 2023-11-29 PROCEDURE — 80053 COMPREHEN METABOLIC PANEL: CPT

## 2023-12-13 ENCOUNTER — OFFICE VISIT (OUTPATIENT)
Dept: GASTROENTEROLOGY | Age: 78
End: 2023-12-13
Payer: OTHER GOVERNMENT

## 2023-12-13 VITALS
HEART RATE: 72 BPM | TEMPERATURE: 97.7 F | SYSTOLIC BLOOD PRESSURE: 118 MMHG | OXYGEN SATURATION: 97 % | BODY MASS INDEX: 22.08 KG/M2 | DIASTOLIC BLOOD PRESSURE: 70 MMHG | HEIGHT: 72 IN | WEIGHT: 163 LBS

## 2023-12-13 DIAGNOSIS — K76.0 FATTY LIVER: ICD-10-CM

## 2023-12-13 DIAGNOSIS — K74.3 PRIMARY BILIARY CHOLANGITIS (HCC): Primary | ICD-10-CM

## 2023-12-13 DIAGNOSIS — K21.9 GASTROESOPHAGEAL REFLUX DISEASE WITHOUT ESOPHAGITIS: ICD-10-CM

## 2023-12-13 PROCEDURE — 1123F ACP DISCUSS/DSCN MKR DOCD: CPT | Performed by: NURSE PRACTITIONER

## 2023-12-13 PROCEDURE — 99213 OFFICE O/P EST LOW 20 MIN: CPT | Performed by: NURSE PRACTITIONER

## 2023-12-13 RX ORDER — PANTOPRAZOLE SODIUM 40 MG/1
40 TABLET, DELAYED RELEASE ORAL
Qty: 90 TABLET | Refills: 4 | Status: SHIPPED | OUTPATIENT
Start: 2023-12-13

## 2023-12-13 RX ORDER — URSODIOL 300 MG/1
300 CAPSULE ORAL 2 TIMES DAILY
Qty: 60 CAPSULE | Refills: 4 | Status: SHIPPED | OUTPATIENT
Start: 2023-12-13

## 2023-12-13 NOTE — PROGRESS NOTES
information on fatty liver and liver disease diet. 3)  GERD that is stable with mild dysphagia. The patient was encouraged to continue taking Protonix daily. Recommend continue with anti-reflux measures and avoid foods that trigger. 4)  The patient was encouraged to follow-up in 6 months.      Total time:  20 minutes